# Patient Record
Sex: FEMALE | Race: WHITE | NOT HISPANIC OR LATINO | Employment: UNEMPLOYED | URBAN - METROPOLITAN AREA
[De-identification: names, ages, dates, MRNs, and addresses within clinical notes are randomized per-mention and may not be internally consistent; named-entity substitution may affect disease eponyms.]

---

## 2020-12-18 ENCOUNTER — HOSPITAL ENCOUNTER (INPATIENT)
Facility: HOSPITAL | Age: 65
LOS: 3 days | Discharge: NURSING, REHAB OR LONG-TERM CARE | End: 2020-12-21
Attending: INTERNAL MEDICINE | Admitting: HOSPITALIST

## 2020-12-18 ENCOUNTER — APPOINTMENT (OUTPATIENT)
Dept: GENERAL RADIOLOGY | Facility: HOSPITAL | Age: 65
End: 2020-12-18

## 2020-12-18 PROBLEM — E87.1 HYPONATREMIA: Status: ACTIVE | Noted: 2020-12-18

## 2020-12-18 LAB
ALBUMIN SERPL-MCNC: 3.4 G/DL (ref 3.5–5.2)
ALBUMIN/GLOB SERPL: 1.8 G/DL
ALP SERPL-CCNC: 90 U/L (ref 39–117)
ALT SERPL W P-5'-P-CCNC: 15 U/L (ref 1–33)
ANION GAP SERPL CALCULATED.3IONS-SCNC: 7 MMOL/L (ref 5–15)
AST SERPL-CCNC: 28 U/L (ref 1–32)
BASOPHILS # BLD AUTO: 0 10*3/MM3 (ref 0–0.2)
BASOPHILS NFR BLD AUTO: 0.2 % (ref 0–1.5)
BILIRUB SERPL-MCNC: 0.9 MG/DL (ref 0–1.2)
BUN SERPL-MCNC: 7 MG/DL (ref 8–23)
BUN/CREAT SERPL: 13.2 (ref 7–25)
CALCIUM SPEC-SCNC: 8.4 MG/DL (ref 8.6–10.5)
CHLORIDE SERPL-SCNC: 91 MMOL/L (ref 98–107)
CHOLEST SERPL-MCNC: 125 MG/DL (ref 0–200)
CO2 SERPL-SCNC: 23 MMOL/L (ref 22–29)
CREAT SERPL-MCNC: 0.53 MG/DL (ref 0.57–1)
DEPRECATED RDW RBC AUTO: 44.6 FL (ref 37–54)
EOSINOPHIL # BLD AUTO: 0 10*3/MM3 (ref 0–0.4)
EOSINOPHIL NFR BLD AUTO: 0.3 % (ref 0.3–6.2)
ERYTHROCYTE [DISTWIDTH] IN BLOOD BY AUTOMATED COUNT: 13.9 % (ref 12.3–15.4)
GFR SERPL CREATININE-BSD FRML MDRD: 115 ML/MIN/1.73
GLOBULIN UR ELPH-MCNC: 1.9 GM/DL
GLUCOSE SERPL-MCNC: 96 MG/DL (ref 65–99)
HCT VFR BLD AUTO: 36.9 % (ref 34–46.6)
HDLC SERPL-MCNC: 55 MG/DL (ref 40–60)
HGB BLD-MCNC: 12.7 G/DL (ref 12–15.9)
LDLC SERPL CALC-MCNC: 57 MG/DL (ref 0–100)
LDLC/HDLC SERPL: 1.05 {RATIO}
LYMPHOCYTES # BLD AUTO: 0.9 10*3/MM3 (ref 0.7–3.1)
LYMPHOCYTES NFR BLD AUTO: 10.3 % (ref 19.6–45.3)
MAGNESIUM SERPL-MCNC: 1.5 MG/DL (ref 1.6–2.4)
MCH RBC QN AUTO: 32.4 PG (ref 26.6–33)
MCHC RBC AUTO-ENTMCNC: 34.3 G/DL (ref 31.5–35.7)
MCV RBC AUTO: 94.5 FL (ref 79–97)
MONOCYTES # BLD AUTO: 0.6 10*3/MM3 (ref 0.1–0.9)
MONOCYTES NFR BLD AUTO: 7.3 % (ref 5–12)
NEUTROPHILS NFR BLD AUTO: 7.2 10*3/MM3 (ref 1.7–7)
NEUTROPHILS NFR BLD AUTO: 81.9 % (ref 42.7–76)
NRBC BLD AUTO-RTO: 0.1 /100 WBC (ref 0–0.2)
PLATELET # BLD AUTO: 276 10*3/MM3 (ref 140–450)
PMV BLD AUTO: 6.3 FL (ref 6–12)
POTASSIUM SERPL-SCNC: 3.9 MMOL/L (ref 3.5–5.2)
PROT SERPL-MCNC: 5.3 G/DL (ref 6–8.5)
RBC # BLD AUTO: 3.9 10*6/MM3 (ref 3.77–5.28)
SODIUM SERPL-SCNC: 121 MMOL/L (ref 136–145)
TRIGL SERPL-MCNC: 60 MG/DL (ref 0–150)
TROPONIN T SERPL-MCNC: <0.01 NG/ML (ref 0–0.03)
TSH SERPL DL<=0.05 MIU/L-ACNC: 2.51 UIU/ML (ref 0.27–4.2)
VLDLC SERPL-MCNC: 13 MG/DL (ref 5–40)
WBC # BLD AUTO: 8.7 10*3/MM3 (ref 3.4–10.8)

## 2020-12-18 PROCEDURE — 93010 ELECTROCARDIOGRAM REPORT: CPT | Performed by: INTERNAL MEDICINE

## 2020-12-18 PROCEDURE — 80053 COMPREHEN METABOLIC PANEL: CPT | Performed by: INTERNAL MEDICINE

## 2020-12-18 PROCEDURE — 85025 COMPLETE CBC W/AUTO DIFF WBC: CPT | Performed by: INTERNAL MEDICINE

## 2020-12-18 PROCEDURE — 71045 X-RAY EXAM CHEST 1 VIEW: CPT

## 2020-12-18 PROCEDURE — 25010000002 ENOXAPARIN PER 10 MG: Performed by: INTERNAL MEDICINE

## 2020-12-18 PROCEDURE — 93005 ELECTROCARDIOGRAM TRACING: CPT | Performed by: INTERNAL MEDICINE

## 2020-12-18 PROCEDURE — 84484 ASSAY OF TROPONIN QUANT: CPT | Performed by: INTERNAL MEDICINE

## 2020-12-18 PROCEDURE — 84443 ASSAY THYROID STIM HORMONE: CPT | Performed by: INTERNAL MEDICINE

## 2020-12-18 PROCEDURE — 83735 ASSAY OF MAGNESIUM: CPT | Performed by: INTERNAL MEDICINE

## 2020-12-18 PROCEDURE — 99223 1ST HOSP IP/OBS HIGH 75: CPT | Performed by: INTERNAL MEDICINE

## 2020-12-18 PROCEDURE — 80061 LIPID PANEL: CPT | Performed by: INTERNAL MEDICINE

## 2020-12-18 PROCEDURE — 25010000002 MAGNESIUM SULFATE 2 GM/50ML SOLUTION: Performed by: INTERNAL MEDICINE

## 2020-12-18 RX ORDER — MAGNESIUM SULFATE HEPTAHYDRATE 40 MG/ML
2 INJECTION, SOLUTION INTRAVENOUS ONCE
Status: COMPLETED | OUTPATIENT
Start: 2020-12-18 | End: 2020-12-18

## 2020-12-18 RX ORDER — LISINOPRIL 20 MG/1
20 TABLET ORAL DAILY
Status: ON HOLD | COMMUNITY
End: 2020-12-21 | Stop reason: SDUPTHER

## 2020-12-18 RX ORDER — ACETAMINOPHEN 325 MG/1
650 TABLET ORAL EVERY 4 HOURS PRN
Status: DISCONTINUED | OUTPATIENT
Start: 2020-12-18 | End: 2020-12-22 | Stop reason: HOSPADM

## 2020-12-18 RX ORDER — SODIUM CHLORIDE 0.9 % (FLUSH) 0.9 %
10 SYRINGE (ML) INJECTION EVERY 12 HOURS SCHEDULED
Status: DISCONTINUED | OUTPATIENT
Start: 2020-12-18 | End: 2020-12-22 | Stop reason: HOSPADM

## 2020-12-18 RX ORDER — CITALOPRAM 20 MG/1
20 TABLET ORAL DAILY
COMMUNITY

## 2020-12-18 RX ORDER — SODIUM CHLORIDE 9 MG/ML
50 INJECTION, SOLUTION INTRAVENOUS CONTINUOUS
Status: DISCONTINUED | OUTPATIENT
Start: 2020-12-18 | End: 2020-12-22 | Stop reason: HOSPADM

## 2020-12-18 RX ORDER — CITALOPRAM 20 MG/1
20 TABLET ORAL DAILY
Status: DISCONTINUED | OUTPATIENT
Start: 2020-12-18 | End: 2020-12-22 | Stop reason: HOSPADM

## 2020-12-18 RX ORDER — RISPERIDONE 1 MG/1
2 TABLET ORAL NIGHTLY
Status: DISCONTINUED | OUTPATIENT
Start: 2020-12-18 | End: 2020-12-22 | Stop reason: HOSPADM

## 2020-12-18 RX ORDER — AMLODIPINE BESYLATE 5 MG/1
10 TABLET ORAL
Status: DISCONTINUED | OUTPATIENT
Start: 2020-12-18 | End: 2020-12-22 | Stop reason: HOSPADM

## 2020-12-18 RX ORDER — RISPERIDONE 1 MG/1
1 TABLET ORAL DAILY
COMMUNITY

## 2020-12-18 RX ORDER — LISINOPRIL 20 MG/1
20 TABLET ORAL DAILY
Status: DISCONTINUED | OUTPATIENT
Start: 2020-12-18 | End: 2020-12-18

## 2020-12-18 RX ORDER — ONDANSETRON 2 MG/ML
4 INJECTION INTRAMUSCULAR; INTRAVENOUS EVERY 6 HOURS PRN
Status: DISCONTINUED | OUTPATIENT
Start: 2020-12-18 | End: 2020-12-22 | Stop reason: HOSPADM

## 2020-12-18 RX ORDER — RISPERIDONE 2 MG/1
2 TABLET ORAL NIGHTLY
COMMUNITY

## 2020-12-18 RX ORDER — RISPERIDONE 1 MG/1
1 TABLET ORAL DAILY
Status: DISCONTINUED | OUTPATIENT
Start: 2020-12-19 | End: 2020-12-22 | Stop reason: HOSPADM

## 2020-12-18 RX ORDER — ATORVASTATIN CALCIUM 40 MG/1
40 TABLET, FILM COATED ORAL NIGHTLY
COMMUNITY

## 2020-12-18 RX ORDER — LEVOTHYROXINE SODIUM 0.07 MG/1
75 TABLET ORAL
Status: DISCONTINUED | OUTPATIENT
Start: 2020-12-19 | End: 2020-12-22 | Stop reason: HOSPADM

## 2020-12-18 RX ORDER — SODIUM CHLORIDE 0.9 % (FLUSH) 0.9 %
10 SYRINGE (ML) INJECTION AS NEEDED
Status: DISCONTINUED | OUTPATIENT
Start: 2020-12-18 | End: 2020-12-22 | Stop reason: HOSPADM

## 2020-12-18 RX ORDER — LISINOPRIL 20 MG/1
40 TABLET ORAL DAILY
Status: DISCONTINUED | OUTPATIENT
Start: 2020-12-18 | End: 2020-12-22 | Stop reason: HOSPADM

## 2020-12-18 RX ORDER — ATORVASTATIN CALCIUM 40 MG/1
40 TABLET, FILM COATED ORAL NIGHTLY
Status: DISCONTINUED | OUTPATIENT
Start: 2020-12-18 | End: 2020-12-22 | Stop reason: HOSPADM

## 2020-12-18 RX ORDER — DOCUSATE SODIUM 100 MG/1
100 CAPSULE, LIQUID FILLED ORAL 2 TIMES DAILY
Status: DISCONTINUED | OUTPATIENT
Start: 2020-12-18 | End: 2020-12-22 | Stop reason: HOSPADM

## 2020-12-18 RX ORDER — LEVOTHYROXINE SODIUM 0.07 MG/1
75 TABLET ORAL DAILY
COMMUNITY

## 2020-12-18 RX ADMIN — LISINOPRIL 40 MG: 20 TABLET ORAL at 16:32

## 2020-12-18 RX ADMIN — Medication 10 ML: at 20:13

## 2020-12-18 RX ADMIN — ENOXAPARIN SODIUM 40 MG: 40 INJECTION SUBCUTANEOUS at 20:12

## 2020-12-18 RX ADMIN — AMLODIPINE BESYLATE 10 MG: 5 TABLET ORAL at 16:32

## 2020-12-18 RX ADMIN — CITALOPRAM HYDROBROMIDE 20 MG: 20 TABLET ORAL at 16:32

## 2020-12-18 RX ADMIN — SODIUM CHLORIDE 50 ML/HR: 9 INJECTION, SOLUTION INTRAVENOUS at 16:32

## 2020-12-18 RX ADMIN — ATORVASTATIN CALCIUM 40 MG: 40 TABLET, FILM COATED ORAL at 20:12

## 2020-12-18 RX ADMIN — DOCUSATE SODIUM 100 MG: 100 CAPSULE, LIQUID FILLED ORAL at 20:13

## 2020-12-18 RX ADMIN — MAGNESIUM SULFATE HEPTAHYDRATE 2 G: 40 INJECTION, SOLUTION INTRAVENOUS at 19:07

## 2020-12-18 NOTE — PLAN OF CARE
Goal Outcome Evaluation:  Plan of Care Reviewed With: patient  Progress: no change  Outcome Summary: Pt admitted after having weakness and falling several times at home. Urinalysis @[facility] shows UTI and started on antibiotics there. Is alert/oriented x3 and obeys commands. Able to answer questions and givve history appropriatly, Hypertensive upon admission, home antihypertensive meds ordered. Will monitor vitals and labs for changes.

## 2020-12-18 NOTE — H&P
[facility] Medicine Services      Patient Name: Norma De Los Santos  : 1955  MRN: [id number]  Primary Care Physician: Chavo Shields  Date of admission: 2020    Patient Care Team:  Chavo Shields as PCP - General (Family Medicine)          Subjective   History Present Illness     Chief Complaint: No chief complaint on file.      Need to select a service from the Handoff activity.      History of Present Illness  65-year-old frail-appearing female with history of alcohol abuse (last alcohol use was 9 months ago), bipolar disorder, essential hypertension, hyperlipidemia, multiple falls in the past few months and hypothyroidism.  She was admitted in transfer from [facility] ([place]), where she presented with acute mental status changes/encephalopathy.  Laboratory at [facility] showed hyponatremia, with sodium of 122, toxicology screen unremarkable and alcohol level within normal limits.  She reportedly tested negative for Covid-19.  Chest x-ray showed no acute cardiopulmonary process.  Urine analysis suggestive for urinary tract infection, but denies dysuria or flank pain.     On arrival to [facility], she was hypertensive with blood pressure of 186/172 mmHg.   She denies chest pain, no shortness of breath, fever, chills, no dysuria, abdominal pain, nausea,  vomiting or diarrhea.  She endorses generalized weakness with poor appetite and oral intake.  Laboratory @ [facility]  is remarkable for hyponatremia, with sodium of 121, otherwise CBC is unremarkable.       Review of Systems   Constitution: Positive for decreased appetite.   HENT: Negative.    Eyes: Negative.    Cardiovascular: Negative.    Respiratory: Negative.    Endocrine: Negative.    Skin: Negative.    Musculoskeletal: Positive for falls and muscle weakness.   Gastrointestinal: Negative.    Genitourinary: Negative.    Neurological: Positive for paresthesias and weakness.   Psychiatric/Behavioral: Positive for altered mental status and depression.      Allergic/Immunologic: Negative.    All other systems reviewed and are negative.             Personal History     Past Medical History:   Past Medical History:   Diagnosis Date   • COPD (chronic obstructive pulmonary disease) (CMS/HCC)    • Disease of thyroid gland    • Elevated cholesterol    • GERD (gastroesophageal reflux disease)    • Hypertension        Surgical History:      Past Surgical History:   Procedure Laterality Date   • BREAST LUMPECTOMY     • BREAST SURGERY     • DILATATION AND CURETTAGE     • FRACTURE SURGERY     • ORIF ELBOW FRACTURE     • WISDOM TOOTH EXTRACTION             Family History: family history is not on file. Otherwise pertinent FHx was reviewed and unremarkable.     Social History:  reports that she has been smoking. She has a 22.50 pack-year smoking history. She has never used smokeless tobacco. She reports that she does not use drugs.      Medications:  Prior to Admission medications    Medication Sig Start Date End Date Taking? Authorizing Provider   atorvastatin (LIPITOR) 40 MG tablet Take 40 mg by mouth Every Night.   Yes Tito Miranda MD   citalopram (CeleXA) 20 MG tablet Take 20 mg by mouth Daily.   Yes Tito Miranda MD   levothyroxine (SYNTHROID, LEVOTHROID) 75 MCG tablet Take 75 mcg by mouth Daily.   Yes Tito Miranda MD   lisinopril (PRINIVIL,ZESTRIL) 20 MG tablet Take 20 mg by mouth Daily.   Yes Tito Miranda MD   risperiDONE (risperDAL) 1 MG tablet Take 1 mg by mouth Daily.   Yes Tito Miranda MD   risperiDONE (risperDAL) 2 MG tablet Take 2 mg by mouth Every Night.   Yes Tito Miranda MD       Allergies:    Allergies   Allergen Reactions   • Amoxicillin Hives       Objective   Objective     Vital Signs  Temp:  [98.1 °F (36.7 °C)-98.2 °F (36.8 °C)] 98.2 °F (36.8 °C)  Heart Rate:  [88-89] 88  Resp:  [23-29] 23  BP: (172-186)/() 172/91  SpO2:  [92 %-93 %] 92 %  on   ;   Device (Oxygen Therapy): room air  There is no height  or weight on file to calculate BMI.    Physical Exam  Constitutional:       General: She is not in acute distress.     Appearance: Normal appearance. She is not ill-appearing.   HENT:      Head: Normocephalic and atraumatic.      Mouth/Throat:      Mouth: Mucous membranes are dry.   Eyes:      Extraocular Movements: Extraocular movements intact.      Pupils: Pupils are equal, round, and reactive to light.   Neck:      Musculoskeletal: Normal range of motion and neck supple. No neck rigidity or muscular tenderness.   Cardiovascular:      Rate and Rhythm: Normal rate and regular rhythm.      Pulses: Normal pulses.      Heart sounds: No murmur. No gallop.    Pulmonary:      Effort: Pulmonary effort is normal.      Breath sounds: Normal breath sounds.   Abdominal:      General: Abdomen is flat.   Musculoskeletal: Normal range of motion.   Skin:     General: Skin is warm.   Neurological:      Mental Status: She is alert. Mental status is at baseline.   Psychiatric:         Mood and Affect: Mood normal.         Behavior: Behavior normal.         Results Review:  I have personally reviewed most recent  and agree with findings, most notably:     Results from last 7 days   Lab Units 12/18/20  1524   WBC 10*3/mm3 8.70   HEMOGLOBIN g/dL 12.7   HEMATOCRIT % 36.9   PLATELETS 10*3/mm3 276     Results from last 7 days   Lab Units 12/18/20  1524   SODIUM mmol/L 121*   POTASSIUM mmol/L 3.9   CHLORIDE mmol/L 91*   CO2 mmol/L 23.0   BUN mg/dL 7*   CREATININE mg/dL 0.53*   GLUCOSE mg/dL 96   CALCIUM mg/dL 8.4*   ALT (SGPT) U/L 15   AST (SGOT) U/L 28     CrCl cannot be calculated (Unknown ideal weight.).  Brief Urine Lab Results     None          Microbiology Results (last 10 days)     ** No results found for the last 240 hours. **          ECG/EMG Results (most recent)     None        Xr Chest 1 View    Result Date: 12/18/2020  No acute chest finding.  Electronically Signed By-Libby Mcclure MD On:12/18/2020 3:50 PM This report was  finalized on [id number] by  Libby Mcclure MD.        CrCl cannot be calculated (Unknown ideal weight.).    Assessment/Plan   Assessment/Plan       Active Hospital Problems    Diagnosis  POA   • Hyponatremia [E87.1]  Yes      Resolved Hospital Problems   No resolved problems to display.     AMS/acute toxic metabolic encephalopathy     -multifactorial, 2/2 hyponatremia, volume contraction, suspected UTI    Hyponatremia (maybe chronic )       -serum osmolality a.m., gentle duration with normal saline        -if no improvement, or worsen will consider nephrology consultation and fluid restrict    Longstanding history of alcohol abuse    Accelerated hypertension      -increase lisinopril and added Norvasc    Bipolar disorder      -celexa/risperdal    Hypothyroidism      -Levothyroxine    Mixed hyperlipidemia       -Lipitor    Mechanical fall at home (with multiple bruises present on admission)/weakness       -check B12, folate, and TSH in a.m.        -will consult PT/OT graft                  VTE Prophylaxis -   Mechanical Order History:     None      Pharmalogical Order History:      Ordered     Dose Route Frequency Stop    12/18/20 1514  enoxaparin (LOVENOX) syringe 40 mg      40 mg SC Every 24 Hours --                CODE STATUS:    Code Status and Medical Interventions:   Ordered at: 12/18/20 1514     Level Of Support Discussed With:    Patient     Code Status:    CPR     Medical Interventions (Level of Support Prior to Arrest):    Full             Electronically signed by Lenard Renner MD, 12/18/20, 4:13 PM EST.  [facility] Team

## 2020-12-19 PROBLEM — R53.1 WEAKNESS: Status: ACTIVE | Noted: 2020-12-19

## 2020-12-19 LAB
AMPHET+METHAMPHET UR QL: NEGATIVE
ANION GAP SERPL CALCULATED.3IONS-SCNC: 8 MMOL/L (ref 5–15)
BACTERIA UR QL AUTO: ABNORMAL /HPF
BARBITURATES UR QL SCN: NEGATIVE
BASOPHILS # BLD AUTO: 0 10*3/MM3 (ref 0–0.2)
BASOPHILS NFR BLD AUTO: 0.3 % (ref 0–1.5)
BENZODIAZ UR QL SCN: NEGATIVE
BILIRUB UR QL STRIP: NEGATIVE
BUN SERPL-MCNC: 7 MG/DL (ref 8–23)
BUN/CREAT SERPL: 14.9 (ref 7–25)
CALCIUM SPEC-SCNC: 8 MG/DL (ref 8.6–10.5)
CANNABINOIDS SERPL QL: NEGATIVE
CHLORIDE SERPL-SCNC: 92 MMOL/L (ref 98–107)
CLARITY UR: CLEAR
CO2 SERPL-SCNC: 24 MMOL/L (ref 22–29)
COCAINE UR QL: NEGATIVE
COLOR UR: YELLOW
CREAT SERPL-MCNC: 0.47 MG/DL (ref 0.57–1)
DEPRECATED RDW RBC AUTO: 45.1 FL (ref 37–54)
EOSINOPHIL # BLD AUTO: 0.1 10*3/MM3 (ref 0–0.4)
EOSINOPHIL NFR BLD AUTO: 0.7 % (ref 0.3–6.2)
ERYTHROCYTE [DISTWIDTH] IN BLOOD BY AUTOMATED COUNT: 13.9 % (ref 12.3–15.4)
FOLATE SERPL-MCNC: 7.71 NG/ML (ref 4.78–24.2)
GFR SERPL CREATININE-BSD FRML MDRD: 132 ML/MIN/1.73
GLUCOSE SERPL-MCNC: 101 MG/DL (ref 65–99)
GLUCOSE UR STRIP-MCNC: NEGATIVE MG/DL
HCT VFR BLD AUTO: 34.7 % (ref 34–46.6)
HGB BLD-MCNC: 12.1 G/DL (ref 12–15.9)
HGB UR QL STRIP.AUTO: NEGATIVE
HYALINE CASTS UR QL AUTO: ABNORMAL /LPF
KETONES UR QL STRIP: ABNORMAL
LEUKOCYTE ESTERASE UR QL STRIP.AUTO: ABNORMAL
LYMPHOCYTES # BLD AUTO: 1.1 10*3/MM3 (ref 0.7–3.1)
LYMPHOCYTES NFR BLD AUTO: 13.2 % (ref 19.6–45.3)
MAGNESIUM SERPL-MCNC: 1.9 MG/DL (ref 1.6–2.4)
MCH RBC QN AUTO: 32.4 PG (ref 26.6–33)
MCHC RBC AUTO-ENTMCNC: 34.9 G/DL (ref 31.5–35.7)
MCV RBC AUTO: 93 FL (ref 79–97)
METHADONE UR QL SCN: NEGATIVE
MONOCYTES # BLD AUTO: 0.8 10*3/MM3 (ref 0.1–0.9)
MONOCYTES NFR BLD AUTO: 9.5 % (ref 5–12)
NEUTROPHILS NFR BLD AUTO: 6.1 10*3/MM3 (ref 1.7–7)
NEUTROPHILS NFR BLD AUTO: 76.3 % (ref 42.7–76)
NITRITE UR QL STRIP: POSITIVE
NRBC BLD AUTO-RTO: 0 /100 WBC (ref 0–0.2)
OPIATES UR QL: NEGATIVE
OSMOLALITY SERPL: 259 MOSM/KG (ref 280–300)
OXYCODONE UR QL SCN: NEGATIVE
PH UR STRIP.AUTO: 6.5 [PH] (ref 5–8)
PLATELET # BLD AUTO: 236 10*3/MM3 (ref 140–450)
PMV BLD AUTO: 6 FL (ref 6–12)
POTASSIUM SERPL-SCNC: 3.6 MMOL/L (ref 3.5–5.2)
PROT UR QL STRIP: ABNORMAL
QT INTERVAL: 376 MS
RBC # BLD AUTO: 3.72 10*6/MM3 (ref 3.77–5.28)
RBC # UR: ABNORMAL /HPF
REF LAB TEST METHOD: ABNORMAL
SODIUM SERPL-SCNC: 124 MMOL/L (ref 136–145)
SP GR UR STRIP: 1.02 (ref 1–1.03)
SQUAMOUS #/AREA URNS HPF: ABNORMAL /HPF
UROBILINOGEN UR QL STRIP: ABNORMAL
VIT B12 BLD-MCNC: 476 PG/ML (ref 211–946)
WBC # BLD AUTO: 8 10*3/MM3 (ref 3.4–10.8)
WBC UR QL AUTO: ABNORMAL /HPF

## 2020-12-19 PROCEDURE — 80307 DRUG TEST PRSMV CHEM ANLYZR: CPT | Performed by: INTERNAL MEDICINE

## 2020-12-19 PROCEDURE — 81001 URINALYSIS AUTO W/SCOPE: CPT | Performed by: INTERNAL MEDICINE

## 2020-12-19 PROCEDURE — 99232 SBSQ HOSP IP/OBS MODERATE 35: CPT | Performed by: INTERNAL MEDICINE

## 2020-12-19 PROCEDURE — 97166 OT EVAL MOD COMPLEX 45 MIN: CPT

## 2020-12-19 PROCEDURE — 82746 ASSAY OF FOLIC ACID SERUM: CPT | Performed by: INTERNAL MEDICINE

## 2020-12-19 PROCEDURE — 85025 COMPLETE CBC W/AUTO DIFF WBC: CPT | Performed by: INTERNAL MEDICINE

## 2020-12-19 PROCEDURE — 83735 ASSAY OF MAGNESIUM: CPT | Performed by: INTERNAL MEDICINE

## 2020-12-19 PROCEDURE — 83930 ASSAY OF BLOOD OSMOLALITY: CPT | Performed by: INTERNAL MEDICINE

## 2020-12-19 PROCEDURE — 87086 URINE CULTURE/COLONY COUNT: CPT | Performed by: INTERNAL MEDICINE

## 2020-12-19 PROCEDURE — 82607 VITAMIN B-12: CPT | Performed by: INTERNAL MEDICINE

## 2020-12-19 PROCEDURE — 25010000002 ENOXAPARIN PER 10 MG: Performed by: INTERNAL MEDICINE

## 2020-12-19 PROCEDURE — 80048 BASIC METABOLIC PNL TOTAL CA: CPT | Performed by: INTERNAL MEDICINE

## 2020-12-19 PROCEDURE — 97162 PT EVAL MOD COMPLEX 30 MIN: CPT

## 2020-12-19 RX ADMIN — Medication 10 ML: at 08:53

## 2020-12-19 RX ADMIN — DOCUSATE SODIUM 100 MG: 100 CAPSULE, LIQUID FILLED ORAL at 08:53

## 2020-12-19 RX ADMIN — MAGNESIUM GLUCONATE 500 MG ORAL TABLET 400 MG: 500 TABLET ORAL at 08:53

## 2020-12-19 RX ADMIN — DOCUSATE SODIUM 100 MG: 100 CAPSULE, LIQUID FILLED ORAL at 21:20

## 2020-12-19 RX ADMIN — CITALOPRAM HYDROBROMIDE 20 MG: 20 TABLET ORAL at 08:53

## 2020-12-19 RX ADMIN — RISPERIDONE 1 MG: 1 TABLET ORAL at 08:53

## 2020-12-19 RX ADMIN — SODIUM CHLORIDE 50 ML/HR: 9 INJECTION, SOLUTION INTRAVENOUS at 10:50

## 2020-12-19 RX ADMIN — RISPERIDONE 2 MG: 1 TABLET ORAL at 21:20

## 2020-12-19 RX ADMIN — ATORVASTATIN CALCIUM 40 MG: 40 TABLET, FILM COATED ORAL at 21:20

## 2020-12-19 RX ADMIN — AMLODIPINE BESYLATE 10 MG: 5 TABLET ORAL at 08:53

## 2020-12-19 RX ADMIN — LEVOTHYROXINE SODIUM 75 MCG: 0.07 TABLET ORAL at 05:28

## 2020-12-19 RX ADMIN — LISINOPRIL 40 MG: 20 TABLET ORAL at 08:53

## 2020-12-19 RX ADMIN — ENOXAPARIN SODIUM 40 MG: 40 INJECTION SUBCUTANEOUS at 16:04

## 2020-12-19 RX ADMIN — Medication 10 ML: at 21:20

## 2020-12-19 NOTE — PLAN OF CARE
Goal Outcome Evaluation:  Plan of Care Reviewed With: patient  Progress: no change  Outcome Summary: 66 yo female admitted with weakness and falls.  d/o UTI.  Pt reports being ind at home, no assistive device use, lives with her son.  Pt has multiple skin tears on UEs from falls, decreased functional ROM LUE from prior accident.  Pt with gait and balance deficits putting her at risk for falls.  Transfers OOB with min A x2.  Left RW in room for furture use.  Concern for pt at home in current condition, recommend IP rehab at d/c.  Discussed with pt and she is agreeable.  Will follow 5x/week.  PPE worn:  gloves, mask, goggles.

## 2020-12-19 NOTE — PLAN OF CARE
Pt A&Ox3, answers ques appropriately, up in chair, from home w/ son, call light in reach, v/s stable, RN will cont to monitor.

## 2020-12-19 NOTE — THERAPY EVALUATION
Patient Name: Norma De Los Santos  : 1955    MRN: [id number]                              Today's Date: 2020       Admit Date: 2020    Visit Dx: No diagnosis found.  Patient Active Problem List   Diagnosis   • Hyponatremia     Past Medical History:   Diagnosis Date   • COPD (chronic obstructive pulmonary disease) (CMS/HCC)    • Disease of thyroid gland    • Elevated cholesterol    • GERD (gastroesophageal reflux disease)    • Hypertension      Past Surgical History:   Procedure Laterality Date   • BREAST LUMPECTOMY     • BREAST SURGERY     • DILATATION AND CURETTAGE     • FRACTURE SURGERY     • ORIF ELBOW FRACTURE     • WISDOM TOOTH EXTRACTION       General Information     Row Name 20 1115          OT Time and Intention    Document Type  evaluation  -NANCY     Mode of Treatment  occupational therapy  -NANCY     Row Name 20 1115          General Information    Patient Profile Reviewed  yes  -NANCY     Prior Level of Function  independent:;ADL's;all household mobility  -NANCY     Existing Precautions/Restrictions  fall  -NANCY     Barriers to Rehab  none identified  -NNACY     Row Name 20 1115          Occupational Profile    Reason for Services/Referral (Occupational Profile)  Pt is a 64 y/o F presenting to ER with weakness and multiple falls. UA noted at OSH + for UTI. Pending additional workup. Pt reports living with her son. Pt reports indep with ADLs and mobility until recently 2/2 weakness. Pt noted to have scattered skin abraisions throughout her UEs with b/l hands wrapped in kerlex.  -NANCY     Row Name 20 1115          Living Environment    Lives With  child(kiah), adult  -NANCY     Row Name 20 1115          Home Main Entrance    Number of Stairs, Main Entrance  none  -NANCY     Row Name 20 1115          Cognition    Orientation Status (Cognition)  oriented x 3  -NANCY     Row Name 20 1115          Safety Issues, Functional Mobility    Safety Issues Affecting Function (Mobility)   insight into deficits/self-awareness;problem-solving  -     Impairments Affecting Function (Mobility)  endurance/activity tolerance;range of motion (ROM);shortness of breath;pain;balance;postural/trunk control  -     Comment, Safety Issues/Impairments (Mobility)  gait belt and non skid socks used  -       User Key  (r) = Recorded By, (t) = Taken By, (c) = Cosigned By    Initials Name Provider Type     Flora Perkins OT Occupational Therapist          Mobility/ADL's     Row Name 12/19/20 1120          Bed Mobility    Bed Mobility  supine-sit  -     Supine-Sit [place] (Bed Mobility)  minimum assist (75% patient effort);1 person assist  -     Bed Mobility, Safety Issues  decreased use of arms for pushing/pulling  -     Assistive Device (Bed Mobility)  bed rails;head of bed elevated  -     Row Name 12/19/20 1120          Transfers    Transfers  bed-chair transfer  -     Comment (Transfers)  Min A x 2 due to posterior lean noted  -     Bed-Chair [place] (Transfers)  2 person assist;minimum assist (75% patient effort);set up  -     Row Name 12/19/20 [facility]          Functional Mobility    Functional Mobility- Ind. Level  2 person assist required;minimum assist (75% patient effort)  -     Functional Mobility-Distance (Feet)  3  -     Functional Mobility- Safety Issues  supplemental O2;step length decreased;loses balance backward  -     Row Name 12/19/20 1120          Activities of Daily Living    BADL Assessment/Intervention  lower body dressing;feeding  -     Row Name 12/19/20 [facility]          Lower Body Dressing Assessment/Training    [place] Level (Lower Body Dressing)  don;doff;socks;moderate assist (50% patient effort);maximum assist (25% patient effort)  -     Position (Lower Body Dressing)  edge of bed sitting  -     Row Name 12/19/20 1120          Self-Feeding Assessment/Training    [place] Level (Feeding)  liquids to mouth;set up;standby assist  -      Position (Self-Feeding)  supported sitting  -       User Key  (r) = Recorded By, (t) = Taken By, (c) = Cosigned By    Initials Name Provider Type    Flora Adkins OT Occupational Therapist        Obj/Interventions     Row Name 12/19/20 1123          Sensory Assessment (Somatosensory)    Sensory Assessment (Somatosensory)  sensation intact  -     Row Name 12/19/20 1123          Vision Assessment/Intervention    Visual Impairment/Limitations  WFL  -     Row Name 12/19/20 1123          Range of Motion Comprehensive    General Range of Motion  upper extremity range of motion deficits identified  -     Comment, General Range of Motion  LUE: Shoulder AROM 0-35 deg. PROM 0-85 deg. Elbow AROM 90-20 deg (lacking full ext from accident years ago). Wrist/Digit AROM WFL despite L RF PIP in hyperextension. RUE: AROM WFL.  -     Row Name 12/19/20 1123          Strength Comprehensive (MMT)    General Manual Muscle Testing (MMT) Assessment  upper extremity strength deficits identified  -     Comment, General Manual Muscle Testing (MMT) Assessment  LUE: shoulder 2-/5, elbow 3/5,  3/5. RUE shoulder 3-/5, elbow 3/5,  3/5  -     Row Name 12/19/20 1123          Balance    Balance Assessment  sitting static balance;standing static balance  -     Static Sitting Balance  WFL;sitting, edge of bed;unsupported  -NANCY     Static Standing Balance  mild impairment;supported;standing  -     Comment, Balance  Posterior lean noted with ambulation and sit to stand from EOB level. Mild LOB posteriorly while at EOB but able to self correct with SBA.  -       User Key  (r) = Recorded By, (t) = Taken By, (c) = Cosigned By    Initials Name Provider Type    Flora Adkins OT Occupational Therapist        Goals/Plan     Row Name 12/19/20 1130          Transfer Goal 1 (OT)    Activity/Assistive Device (Transfer Goal 1, OT)  sit-to-stand/stand-to-sit;bed-to-chair/chair-to-bed;commode, bedside without drop  arms;walker, rolling  -NANCY     [place] Level/Cues Needed (Transfer Goal 1, OT)  set-up required;standby assist  -NANCY     Time Frame (Transfer Goal 1, OT)  long term goal (LTG);2 weeks  -     Row Name 12/19/20 1130          Bathing Goal 1 (OT)    Activity/Device (Bathing Goal 1, OT)  upper body bathing;lower body bathing  -NANCY     [place] Level/Cues Needed (Bathing Goal 1, OT)  set-up required;minimum assist (75% or more patient effort)  -NANCY     Time Frame (Bathing Goal 1, OT)  long term goal (LTG);2 weeks  -[facility] Name 12/19/20 1130          Dressing Goal 1 (OT)    Activity/Device (Dressing Goal 1, OT)  upper body dressing;lower body dressing  -NANCY     [place]/Cues Needed (Dressing Goal 1, OT)  set-up required;minimum assist (75% or more patient effort)  -NANCY     Time Frame (Dressing Goal 1, OT)  long term goal (LTG);2 weeks  -[facility] Name 12/19/20 1130          Therapy Assessment/Plan (OT)    Planned Therapy Interventions (OT)  activity tolerance training;functional balance retraining;occupation/activity based interventions;ROM/therapeutic exercise;strengthening exercise;transfer/mobility retraining;patient/caregiver education/training;neuromuscular control/coordination retraining;cognitive/visual perception retraining;edema control/reduction;BADL retraining;adaptive equipment training  -       User Key  (r) = Recorded By, (t) = Taken By, (c) = Cosigned By    Initials Name Provider Type    NANCY Flora Perkins OT Occupational Therapist        Clinical Impression     [facility] Name 12/19/20 1124          Pain Assessment    Additional Documentation  Pain Scale: FACES Pre/Post-Treatment (Group)  -[facility] Name 12/19/20 1121          Pain Scale: FACES Pre/Post-Treatment    Pain: FACES Scale, Pretreatment  2-->hurts little bit  -NANCY     Posttreatment Pain Rating  4-->hurts little more  -     Pain Location - Side  Left  -     Pain Location  hip  -NANCY     Pre/Posttreatment Pain Comment  RN notified.  No obvious eccyhmosis around area of reported discomfort.  -     Row Name 12/19/20 1127          Plan of Care Review    Plan of Care Reviewed With  patient  -NANCY     Outcome Summary  Pt is a 66 y/o F presenting to ER with weakness and multiple falls. UA noted at OSH + for UTI. Pending additional workup. Pt reports living with her son. Pt reports indep with ADLs and mobility until recently 2/2 weakness. Pt noted to have scattered skin abrasions throughout her UEs with b/l hands wrapped in kerlex. Pt noted to have LUE ROM deficits at baseline from accident years ago per her report. Pt tolerated getting to EOB and transferring to chair with min A x 2 due to posterior lean and short step pattern. Pt appears significantly below her ADL baseline, limited by impaired activity tolerance, impaired ROM/strength, impaired balance, impaired insight and acuity of illness. OT recommends continued treatment at 5x/week and d/c to IP rehab pending progress and medical stability. PPE worn: mask, gloves and goggles.  -     Row Name 12/19/20 1127          Therapy Assessment/Plan (OT)    Rehab Potential (OT)  fair, will monitor progress closely  -     Criteria for Skilled Therapeutic Interventions Met (OT)  skilled treatment is necessary  -NANCY     Therapy Frequency (OT)  5 times/wk  -     Row Name 12/19/20 1127          Therapy Plan Review/Discharge Plan (OT)    Anticipated Discharge Disposition (OT)  inpatient rehabilitation facility  -     Row Name 12/19/20 1127          Vital Signs    Pretreatment Heart Rate (beats/min)  85  -NANCY     Posttreatment Heart Rate (beats/min)  56  -NANCY     Pre SpO2 (%)  97  -NANCY     O2 Delivery Pre Treatment  nasal cannula  -NANCY     Post SpO2 (%)  94  -NANCY     O2 Delivery Post Treatment  room air  -     Row Name 12/19/20 1127          Positioning and Restraints    Pre-Treatment Position  in bed  -NANCY     Post Treatment Position  chair  -NANCY     In Chair  notified nsg;sitting;call light within  reach;encouraged to call for assist;exit alarm on  -       User Key  (r) = Recorded By, (t) = Taken By, (c) = Cosigned By    Initials Name Provider Type    Flora Adkins OT Occupational Therapist        Outcome Measures     Row Name 12/19/20 1131          Modified [place] Scale    Modified [place] Scale  4 - Moderately severe disability.  Unable to walk without assistance, and unable to attend to own bodily needs without assistance.  -     Row Name 12/19/20 1131          Functional Assessment    Outcome Measure Options  Modified [place]  -       User Key  (r) = Recorded By, (t) = Taken By, (c) = Cosigned By    Initials Name Provider Type    Flora Adkins OT Occupational Therapist        Occupational Therapy Education                 Title: PT OT SLP Therapies (In Progress)     Topic: Occupational Therapy (Done)     Point: ADL training (Done)     Description:   Instruct learner(s) on proper safety adaptation and remediation techniques during self care or transfers.   Instruct in proper use of assistive devices.              Learning Progress Summary           Patient Acceptance, E, VU,DU,NR by  at 12/19/2020 1131                               User Key     Initials Effective Dates Name Provider Type Discipline     07/15/20 -  Flora Perkins OT Occupational Therapist OT              OT Recommendation and Plan  Planned Therapy Interventions (OT): activity tolerance training, functional balance retraining, occupation/activity based interventions, ROM/therapeutic exercise, strengthening exercise, transfer/mobility retraining, patient/caregiver education/training, neuromuscular control/coordination retraining, cognitive/visual perception retraining, edema control/reduction, BADL retraining, adaptive equipment training  Therapy Frequency (OT): 5 times/wk  Plan of Care Review  Plan of Care Reviewed With: patient  Outcome Summary: Pt is a 66 y/o F presenting to ER with weakness and multiple  falls. UA noted at OSH + for UTI. Pending additional workup. Pt reports living with her son. Pt reports indep with ADLs and mobility until recently 2/2 weakness. Pt noted to have scattered skin abrasions throughout her UEs with b/l hands wrapped in kerlex. Pt noted to have LUE ROM deficits at baseline from accident years ago per her report. Pt tolerated getting to EOB and transferring to chair with min A x 2 due to posterior lean and short step pattern. Pt appears significantly below her ADL baseline, limited by impaired activity tolerance, impaired ROM/strength, impaired balance, impaired insight and acuity of illness. OT recommends continued treatment at 5x/week and d/c to IP rehab pending progress and medical stability. PPE worn: mask, gloves and goggles.     Time Calculation:   Time Calculation- OT     Row Name 12/19/20 1132             Time Calculation- OT    OT Start Time  0850  -NANCY      OT Stop Time  0908  -      OT Time Calculation (min)  18 min  -      Total Timed Code Minutes- OT  18 minute(s)  -NANCY      OT Received On  12/19/20  -NANCY      OT - Next Appointment  12/21/20  -      OT Goal Re-Cert Due Date  01/02/21  -        User Key  (r) = Recorded By, (t) = Taken By, (c) = Cosigned By    Initials Name Provider Type    Flora Adkins OT Occupational Therapist        Therapy Charges for Today     Code Description Service Date Service Provider Modifiers Qty    [id number]  OT EVAL MOD COMPLEXITY 3 12/19/2020 Flora Perkins OT GO 1               Flora Perkins OT  12/19/2020

## 2020-12-19 NOTE — DISCHARGE PLACEMENT REQUEST
"Norma Schneider (65 y.o. Female)     Date of Birth Social Security Number Address Home Phone MRN    1955  [address] [place] [address] [phone] [id number]    B[employer]ism Marital Status          Non-Yazidism        Admission Date Admission Type Admitting Provider Attending Provider Department, Room/Bed    12/18/20 Urgent Lenard Renner MD Ojo-Oniyun, Saheed G, MD [facility] NEURO HEART, 267/1    Discharge Date Discharge Disposition Discharge Destination                       Attending Provider: Lenard Renner MD    Allergies: Amoxicillin    Isolation: None   Infection: None   Code Status: CPR    Ht: 180.3 cm (71\")   Wt: 67.3 kg (148 lb 5.9 oz)    Admission Cmt: None   Principal Problem: None                Active Insurance as of 12/18/2020     Primary Coverage     Payor Plan Insurance Group Employer/Plan Group    [employer]UE [employer]OSS [employer]UE [employer]OSS BLUE [employer] PPO [id number]     Payor Plan Address Payor Plan Phone Number Payor Plan Fax Number Effective Dates    [address] [phone]  12/18/2020 - None Entered    [place] [address]       Subscriber Name Subscriber Birth Date Member ID       KEVIN SCHNEIDER  [id number]                 Emergency Contacts      (Rel.) Home Phone Work Phone Mobile Phone    Kevin Schneider (Spouse) [phone] -- [phone]    Wally Schneider (Son) -- -- [phone]            History & Physical    No notes of this type exist for this encounter.         {Outbreak/Travel/Exposure Documentation......;  Question Available Choices Patient Response   Outbreak Screen: Do you currently have a new onset of the following symptoms?        Fever/Chils, Cough, Shortness of air, Loss of taste or smell, No, Unknown  (!) Shortness of Air (12/18/20 1230)   Outbreak Screen: In the last 14 days, have you had contact with anyone who is ill, has show any of the symptoms listed above and/or has been diagnosis with the 2019 Novel Coronavirus? " This includes any immediate household members but excludes any patients with whom you have been in contact within your normal work duties wearing proper PPE, if you are a healthcare worker.  Yes, No, Unknown              Unknown (12/18/20 1230)   Outbreak Screen: Who was notified?    Free text  (not recorded)   Travel Screen: Have you traveled in the last month? If so, to what country have you traveled? If [place] what state? Yes, No, Unknown  List of all countries  List of all States No (12/18/20 1400)  (not recorded)  (not recorded)   Infection Risk: Do you currently have the following symptoms?  (If cough is selected, the Tuberculosis Screen is performed.) Cough, Fever, Rash, No (!) Cough (12/18/20 1400)   Tuberculosis Screen: Do you have any of the following Tuberculosis Risks?  · Have you lived or spent time with anyone who had or may have TB?  · Have you lived in or visited any of the following areas for more than one month: [place], [place], [place], [place] or [place], the [place] or [place]?  · Do you have HIV/AIDS?  · Have you lived in or worked in a nursing home, homeless shelter, correctional facility, or substance abuse treatment facility?   · No    If Yes do you have any of the following symptoms? Yes responses display to the right    If Yes, symptoms listed are:  Cough greater than or equal to 3 weeks, Loss of appetite, Unexplained weight loss, Night sweats, Bloody sputum or hemoptysis, Hoarseness, Fever, Fatigue, Chest pain, No No (12/18/20 1400)  (not recorded)   Exposure Screen: Have you been exposed to any of these contagious diseases in the last month? Measles, Chickenpox, Meningitis, Pertussis, Whooping Cough, No No (12/18/20 1400)       Current Facility-Administered Medications   Medication Dose Route Frequency Provider Last Rate Last Admin   • acetaminophen (TYLENOL) tablet 650 mg  650 mg Oral Q4H PRN Lenard Renner MD       • amLODIPine (NORVASC) tablet 10 mg  10 mg Oral Q24H  Lenard Renner MD   10 mg at 12/19/20 0853   • atorvastatin (LIPITOR) tablet 40 mg  40 mg Oral Nightly Lenard Renner MD   40 mg at 12/18/20 2012   • citalopram (CeleXA) tablet 20 mg  20 mg Oral Daily Lenard Renner MD   20 mg at 12/19/20 0853   • docusate sodium (COLACE) capsule 100 mg  100 mg Oral BID Lenard Renner MD   100 mg at 12/19/20 0853   • enoxaparin (LOVENOX) syringe 40 mg  40 mg Subcutaneous Q24H Lenard Renner MD   40 mg at 12/18/20 2012   • levothyroxine (SYNTHROID, LEVOTHROID) tablet 75 mcg  75 mcg Oral Q AM Lenard Renner MD   75 mcg at 12/19/20 0528   • lisinopril (PRINIVIL,ZESTRIL) tablet 40 mg  40 mg Oral Daily Lenard Renner MD   40 mg at 12/19/20 0853   • magnesium oxide (MAGOX) tablet 400 mg  400 mg Oral Daily Lenard Renner MD   400 mg at 12/19/20 0853   • ondansetron (ZOFRAN) injection 4 mg  4 mg Intravenous Q6H PRN Lenard Renner MD       • risperiDONE (risperDAL) tablet 1 mg  1 mg Oral Daily Lenard Renner MD   1 mg at 12/19/20 0853   • risperiDONE (risperDAL) tablet 2 mg  2 mg Oral Nightly Lenard Renner MD       • sodium chloride 0.9 % flush 10 mL  10 mL Intravenous Q12H Lenard Renner MD   10 mL at 12/19/20 0853   • sodium chloride 0.9 % flush 10 mL  10 mL Intravenous PRN Lenard Renner MD       • sodium chloride 0.9 % infusion  50 mL/hr Intravenous Continuous Lenard Renner MD 50 mL/hr at 12/18/20 1632 50 mL/hr at 12/18/20 1632     Physician Progress Notes (most recent note)    No notes of this type exist for this encounter.         Consult Notes (most recent note)    No notes of this type exist for this encounter.         Physical Therapy Notes (most recent note)    No notes exist for this encounter.         Occupational Therapy Notes (most recent note)    No notes exist for this encounter.
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
## 2020-12-19 NOTE — PROGRESS NOTES
Continued Stay Note   Sha     Patient Name: Norma De Los Santos  MRN: [id number]  Today's Date: 12/19/2020    Admit Date: 12/18/2020    Discharge Plan     Row Name 12/19/20 1416       Plan    Plan  [facility] at d/c. Pre-cert waived per COVID-19 emergency preparedness waiver. PASRR approved per CMS 1135 waiver.    Plan Comments  Pt has been accepted at [facility] per liaison (Ne). Pre-cert waived due to COVID-19 preparedness waiver. SW notified RN & MD via secure chat.        Vilma Acosta, JOESPHW, LSW  PRN   Phone: [phone]

## 2020-12-19 NOTE — PLAN OF CARE
Problem: Adult Inpatient Plan of Care  Goal: Plan of Care Review  Outcome: Ongoing, Progressing  Flowsheets (Taken 12/19/2020 0419)  Progress: no change  Plan of Care Reviewed With: patient  Goal: Patient-Specific Goal (Individualized)  Outcome: Ongoing, Progressing  Goal: Absence of Hospital-Acquired Illness or Injury  Outcome: Ongoing, Progressing  Intervention: Identify and Manage Fall Risk  Recent Flowsheet Documentation  Taken 12/19/2020 0318 by Ortiz Sandoval RN  Safety Promotion/Fall Prevention:   safety round/check completed   room organization consistent   nonskid shoes/slippers when out of bed   lighting adjusted   fall prevention program maintained   clutter free environment maintained   assistive device/personal items within reach   activity supervised  Taken 12/19/2020 0000 by Ortiz Sandoval RN  Safety Promotion/Fall Prevention:   safety round/check completed   room organization consistent   nonskid shoes/slippers when out of bed   lighting adjusted   fall prevention program maintained   clutter free environment maintained   assistive device/personal items within reach   activity supervised  Taken 12/18/2020 2000 by Ortiz Sandoval RN  Safety Promotion/Fall Prevention:   safety round/check completed   room organization consistent   nonskid shoes/slippers when out of bed   lighting adjusted   fall prevention program maintained   clutter free environment maintained   assistive device/personal items within reach   activity supervised  Intervention: Prevent Infection  Recent Flowsheet Documentation  Taken 12/19/2020 0318 by Ortiz Sandoval RN  Infection Prevention:   personal protective equipment utilized   visitors restricted/screened  Taken 12/19/2020 0000 by Ortiz Sandoval RN  Infection Prevention:   personal protective equipment utilized   visitors restricted/screened  Taken 12/18/2020 2000 by Ortiz Sandoval RN  Infection Prevention:   personal protective equipment utilized    visitors restricted/screened  Goal: Optimal Comfort and Wellbeing  Outcome: Ongoing, Progressing  Intervention: Provide Person-Centered Care  Recent Flowsheet Documentation  Taken 12/19/2020 0318 by Ortiz Sandoval RN  Trust Relationship/Rapport: care explained  Taken 12/19/2020 0000 by Ortiz Sandoval RN  Trust Relationship/Rapport:   care explained   choices provided   thoughts/feelings acknowledged  Taken 12/18/2020 2000 by Ortiz Sandoval RN  Trust Relationship/Rapport:   care explained   choices provided   thoughts/feelings acknowledged  Goal: Readiness for Transition of Care  Outcome: Ongoing, Progressing     Problem: Fall Injury Risk  Goal: Absence of Fall and Fall-Related Injury  Outcome: Ongoing, Progressing  Intervention: Identify and Manage Contributors to Fall Injury Risk  Recent Flowsheet Documentation  Taken 12/19/2020 0318 by Ortiz Sandoval RN  Medication Review/Management: medications reviewed  Taken 12/19/2020 0000 by Ortiz Sandoval RN  Medication Review/Management: medications reviewed  Taken 12/18/2020 2000 by Ortiz Sandoval RN  Medication Review/Management: medications reviewed  Intervention: Promote Injury-Free Environment  Recent Flowsheet Documentation  Taken 12/19/2020 0318 by Ortiz Sandoval RN  Safety Promotion/Fall Prevention:   safety round/check completed   room organization consistent   nonskid shoes/slippers when out of bed   lighting adjusted   fall prevention program maintained   clutter free environment maintained   assistive device/personal items within reach   activity supervised  Taken 12/19/2020 0000 by Ortiz Sandoval RN  Safety Promotion/Fall Prevention:   safety round/check completed   room organization consistent   nonskid shoes/slippers when out of bed   lighting adjusted   fall prevention program maintained   clutter free environment maintained   assistive device/personal items within reach   activity supervised  Taken 12/18/2020 2000 by  Ortiz Sandoval, RN  Safety Promotion/Fall Prevention:   safety round/check completed   room organization consistent   nonskid shoes/slippers when out of bed   lighting adjusted   fall prevention program maintained   clutter free environment maintained   assistive device/personal items within reach   activity supervised     Problem: Skin Injury Risk Increased  Goal: Skin Health and Integrity  Outcome: Ongoing, Progressing  Intervention: Optimize Skin Protection  Recent Flowsheet Documentation  Taken 12/19/2020 0000 by Ortiz Sandoval RN  Pressure Reduction Techniques:   frequent weight shift encouraged   weight shift assistance provided  Pressure Reduction Devices:   pressure-redistributing mattress utilized   positioning supports utilized  Skin Protection:   adhesive use limited   incontinence pads utilized  Taken 12/18/2020 2000 by Ortiz Sandoval RN  Pressure Reduction Techniques:   frequent weight shift encouraged   weight shift assistance provided  Pressure Reduction Devices:   pressure-redistributing mattress utilized   positioning supports utilized  Skin Protection:   adhesive use limited   incontinence pads utilized   tubing/devices free from skin contact     Problem: Electrolyte Imbalance  Goal: Electrolyte Balance  Outcome: Ongoing, Progressing  Intervention: Monitor and Manage Electrolyte Imbalance  Recent Flowsheet Documentation  Taken 12/19/2020 0318 by Ortiz Sandoval RN  Fluid/Electrolyte Management: fluids provided  Taken 12/19/2020 0000 by Ortiz Sandoval RN  Fluid/Electrolyte Management: fluids provided  Taken 12/18/2020 2000 by Ortiz Sandoval RN  Fluid/Electrolyte Management: fluids provided   Goal Outcome Evaluation:  Plan of Care Reviewed With: patient  Progress: no change

## 2020-12-19 NOTE — PROGRESS NOTES
"      [facility] Medicine Services Daily Progress Note      Hospitalist Team  LOS 1 days      Patient Care Team:  Chavo Shields as PCP - General (Family Medicine)    Patient Location: 267/1      Subjective   Subjective   I feel a little bit better this morning.  Chief Complaint / Subjective  No chief complaint on file.        Brief Synopsis of Hospital Course/HPI  65-year-old frail-appearing female with history of alcohol abuse (last alcohol use was 9 months ago), bipolar disorder, essential hypertension, hyperlipidemia, multiple falls in the past few months and hypothyroidism.  She was admitted in transfer from [facility] ([facility], where she presented with acute mental status changes/encephalopathy.  Laboratory at [facility] showed hyponatremia, with sodium of 122, toxicology screen unremarkable and alcohol level within normal limits.  She reportedly tested negative for Covid-19.  Chest x-ray showed no acute cardiopulmonary process.  Urine analysis suggestive for urinary tract infection, but denies dysuria or flank pain.    On arrival to [facility], she was hypertensive with blood pressure of 186/172 mmHg.   She denies chest pain, no shortness of breath, fever, chills, no dysuria, abdominal pain, nausea,  vomiting or diarrhea.  She endorses generalized weakness with poor appetite and oral intake.  Laboratory @ [facility]  is remarkable for hyponatremia, with sodium of 121, otherwise CBC is unremarkable.       Objective   Objective    Seen and examined in follow-up.  Sitting comfortably in chair in no distress or discomfort.  No events reported overnight.  Vital Signs  Temp:  [97.4 °F (36.3 °C)-99 °F (37.2 °C)] 97.4 °F (36.3 °C)  Heart Rate:  [] 83  Resp:  [22-28] 28  BP: (137-172)/(73-91) 137/73  Oxygen Therapy  SpO2: 95 %  Pulse Oximetry Type: Continuous  Device (Oxygen Therapy): room air  Flow (L/min): 2  Flowsheet Rows      First Filed Value   Admission Height  180.3 cm (71\") Documented at 12/18/2020 1900   "   Admission Weight  67.1 kg (147 lb 14.9 oz) Documented at 12/18/2020 1900        Intake & Output (last 3 days)       12/16 0701 - 12/17 0700 12/17 0701 - 12/18 0700 12/18 0701 - 12/19 0700 12/19 0701 - 12/20 0700    P.O.   444 480    I.V. (mL/kg)   1000 (14.9)     Total Intake(mL/kg)   1444 (21.5) 480 (7.1)    Net   +1444 +480            Urine Unmeasured Occurrence   4 x 1 x    Stool Unmeasured Occurrence    1 x        Lines, Drains & Airways    Active LDAs     Name:   Placement date:   Placement time:   Site:   Days:    Peripheral IV (Ped/Tyler) 12/18/20 Right;Anterior Forearm   12/18/20    0330     1                Physical Exam  Constitutional:       General: She is not in acute distress.     Appearance: Normal appearance. She is not ill-appearing.   HENT:      Head: Normocephalic and atraumatic.      Mouth/Throat:      Mouth: Mucous membranes are dry.   Eyes:      Extraocular Movements: Extraocular movements intact.      Pupils: Pupils are equal, round, and reactive to light.   Neck:      Musculoskeletal: Normal range of motion and neck supple. No neck rigidity or muscular tenderness.   Cardiovascular:      Rate and Rhythm: Normal rate and regular rhythm.      Pulses: Normal pulses.      Heart sounds: No murmur. No gallop.    Pulmonary:      Effort: Pulmonary effort is normal.      Breath sounds: Normal breath sounds.   Abdominal:      General: Abdomen is flat.   Musculoskeletal: Normal range of motion.   Skin:     General: Skin is warm.   Neurological:      Mental Status: She is alert. Mental status is at baseline.   Psychiatric:         Mood and Affect: Mood normal.         Behavior: Behavior normal.       Procedures:    Results Review:     I reviewed the patient's new clinical results.      Lab Results (last 24 hours)     Procedure Component Value Units Date/Time    Folate [[id number]]  (Normal) Collected: 12/19/20 0421    Specimen: Blood Updated: 12/19/20 1156     Folate 7.71 ng/mL     Narrative:      Results  may be falsely increased if patient taking Biotin.      Vitamin B12 [[id number]]  (Normal) Collected: 12/19/20 0421    Specimen: Blood Updated: 12/19/20 1156     Vitamin B-12 476 pg/mL     Narrative:      Results may be falsely increased if patient taking Biotin.      Osmolality, Serum [[id number]]  (Abnormal) Collected: 12/19/20 0852    Specimen: Blood Updated: 12/19/20 0938     Osmolality 259 mOsm/kg     Magnesium [[id number]]  (Normal) Collected: 12/19/20 0421    Specimen: Blood Updated: 12/19/20 0539     Magnesium 1.9 mg/dL     Basic Metabolic Panel [[id number]]  (Abnormal) Collected: 12/19/20 0421    Specimen: Blood Updated: 12/19/20 0509     Glucose 101 mg/dL      BUN 7 mg/dL      Creatinine 0.47 mg/dL      Sodium 124 mmol/L      Potassium 3.6 mmol/L      Chloride 92 mmol/L      CO2 24.0 mmol/L      Calcium 8.0 mg/dL      eGFR Non African Amer 132 mL/min/1.73      BUN/Creatinine Ratio 14.9     Anion Gap 8.0 mmol/L     Narrative:      GFR Normal >60  Chronic Kidney Disease <60  Kidney Failure <15      CBC Auto Differential [[id number]]  (Abnormal) Collected: 12/19/20 0421    Specimen: Blood Updated: 12/19/20 0452     WBC 8.00 10*3/mm3      RBC 3.72 10*6/mm3      Hemoglobin 12.1 g/dL      Hematocrit 34.7 %      MCV 93.0 fL      MCH 32.4 pg      MCHC 34.9 g/dL      RDW 13.9 %      RDW-SD 45.1 fl      MPV 6.0 fL      Platelets 236 10*3/mm3      Neutrophil % 76.3 %      Lymphocyte % 13.2 %      Monocyte % 9.5 %      Eosinophil % 0.7 %      Basophil % 0.3 %      Neutrophils, Absolute 6.10 10*3/mm3      Lymphocytes, Absolute 1.10 10*3/mm3      Monocytes, Absolute 0.80 10*3/mm3      Eosinophils, Absolute 0.10 10*3/mm3      Basophils, Absolute 0.00 10*3/mm3      nRBC 0.0 /100 WBC     Urine Drug Screen - Urine, Clean Catch [[id number]]  (Normal) Collected: 12/19/20 0211    Specimen: Urine, Clean Catch Updated: 12/19/20 0242     Amphet/Methamphet, Screen Negative     Barbiturates Screen, Urine Negative     Benzodiazepine  Screen, Urine Negative     Cocaine Screen, Urine Negative     Opiate Screen Negative     THC, Screen, Urine Negative     Methadone Screen, Urine Negative     Oxycodone Screen, Urine Negative    Narrative:      Negative Thresholds For Drugs Screened:     Amphetamines               500 ng/ml   Barbiturates               200 ng/ml   Benzodiazepines            100 ng/ml   Cocaine                    300 ng/ml   Methadone                  300 ng/ml   Opiates                    300 ng/ml   Oxycodone                  100 ng/ml   THC                        50 ng/ml    The Normal Value for all drugs tested is negative. This report includes final unconfirmed screening results to be used for medical treatment purposes only. Unconfirmed results must not be used for non-medical purposes such as employment or legal testing. Clinical consideration should be applied to any drug of abuse test, particulary when unconfirmed results are used.  All urine drugs of abuse requests without chain of custody are for medical screening purposes only.  False positives are possible.      Urinalysis, Microscopic Only - Urine, Clean Catch [[id number]]  (Abnormal) Collected: 12/19/20 0211    Specimen: Urine, Clean Catch Updated: 12/19/20 0229     RBC, UA 0-2 /HPF      WBC, UA 6-12 /HPF      Bacteria, UA None Seen /HPF      Squamous Epithelial Cells, UA 0-2 /HPF      Hyaline Casts, UA 3-6 /LPF      Methodology Automated Microscopy    Urinalysis With Culture If Indicated - Urine, Clean Catch [[id number]]  (Abnormal) Collected: 12/19/20 0211    Specimen: Urine, Clean Catch Updated: 12/19/20 0229     Color, UA Yellow     Appearance, UA Clear     pH, UA 6.5     Specific Gravity, UA 1.017     Glucose, UA Negative     Ketones, UA Trace     Bilirubin, UA Negative     Blood, UA Negative     Protein, UA 30 mg/dL (1+)     Leuk Esterase, UA Small (1+)     Nitrite, UA Positive     Urobilinogen, UA 1.0 E.U./dL    Urine Culture - Urine, Urine, Clean Catch [[id number]]  Collected: 12/19/20 0211    Specimen: Urine, Clean Catch Updated: 12/19/20 0229    Troponin [[id number]]  (Normal) Collected: 12/18/20 1524    Specimen: Blood Updated: 12/18/20 1614     Troponin T <0.010 ng/mL     Narrative:      Troponin T Reference Range:  <= 0.03 ng/mL-   Negative for AMI  >0.03 ng/mL-     Abnormal for myocardial necrosis.  Clinicians would have to utilize clinical acumen, EKG, Troponin and serial changes to determine if it is an Acute Myocardial Infarction or myocardial injury due to an underlying chronic condition.       Results may be falsely decreased if patient taking Biotin.      TSH [[id number]]  (Normal) Collected: 12/18/20 1524    Specimen: Blood Updated: 12/18/20 1614     TSH 2.510 uIU/mL     Lipid Panel [[id number]] Collected: 12/18/20 1524    Specimen: Blood Updated: 12/18/20 1611     Total Cholesterol 125 mg/dL      Triglycerides 60 mg/dL      HDL Cholesterol 55 mg/dL      LDL Cholesterol  57 mg/dL      VLDL Cholesterol 13 mg/dL      LDL/HDL Ratio 1.05    Narrative:      Cholesterol Reference Ranges  (U.S. Department of Health and Human Services ATP III Classifications)    Desirable          <200 mg/dL  Borderline High    200-239 mg/dL  High Risk          >240 mg/dL      Triglyceride Reference Ranges  (U.S. Department of Health and Human Services ATP III Classifications)    Normal           <150 mg/dL  Borderline High  150-199 mg/dL  High             200-499 mg/dL  Very High        >500 mg/dL    HDL Reference Ranges  (U.S. Department of Health and Human Services ATP III Classifcations)    Low     <40 mg/dl (major risk factor for CHD)  High    >60 mg/dl ('negative' risk factor for CHD)        LDL Reference Ranges  (U.S. Department of Health and Human Services ATP III Classifcations)    Optimal          <100 mg/dL  Near Optimal     100-129 mg/dL  Borderline High  130-159 mg/dL  High             160-189 mg/dL  Very High        >189 mg/dL    Comprehensive Metabolic Panel [[id number]]   (Abnormal) Collected: 12/18/20 1524    Specimen: Blood Updated: 12/18/20 1611     Glucose 96 mg/dL      BUN 7 mg/dL      Creatinine 0.53 mg/dL      Sodium 121 mmol/L      Potassium 3.9 mmol/L      Comment: Slight hemolysis detected by analyzer. Results may be affected.        Chloride 91 mmol/L      CO2 23.0 mmol/L      Calcium 8.4 mg/dL      Total Protein 5.3 g/dL      Albumin 3.40 g/dL      ALT (SGPT) 15 U/L      AST (SGOT) 28 U/L      Alkaline Phosphatase 90 U/L      Total Bilirubin 0.9 mg/dL      eGFR Non African Amer 115 mL/min/1.73      Globulin 1.9 gm/dL      A/G Ratio 1.8 g/dL      BUN/Creatinine Ratio 13.2     Anion Gap 7.0 mmol/L     Narrative:      GFR Normal >60  Chronic Kidney Disease <60  Kidney Failure <15      Magnesium [[id number]]  (Abnormal) Collected: 12/18/20 1524    Specimen: Blood Updated: 12/18/20 1611     Magnesium 1.5 mg/dL     CBC Auto Differential [[id number]]  (Abnormal) Collected: 12/18/20 1524    Specimen: Blood Updated: 12/18/20 1551     WBC 8.70 10*3/mm3      RBC 3.90 10*6/mm3      Hemoglobin 12.7 g/dL      Hematocrit 36.9 %      MCV 94.5 fL      MCH 32.4 pg      MCHC 34.3 g/dL      RDW 13.9 %      RDW-SD 44.6 fl      MPV 6.3 fL      Platelets 276 10*3/mm3      Neutrophil % 81.9 %      Lymphocyte % 10.3 %      Monocyte % 7.3 %      Eosinophil % 0.3 %      Basophil % 0.2 %      Neutrophils, Absolute 7.20 10*3/mm3      Lymphocytes, Absolute 0.90 10*3/mm3      Monocytes, Absolute 0.60 10*3/mm3      Eosinophils, Absolute 0.00 10*3/mm3      Basophils, Absolute 0.00 10*3/mm3      nRBC 0.1 /100 WBC         No results found for: HGBA1C            No results found for: LIPASE  Lab Results   Component Value Date    CHOL 125 12/18/2020    TRIG 60 12/18/2020    HDL 55 12/18/2020    LDL 57 12/18/2020       No results found for: INTRAOP, PREDX, FINALDX, COMDX    Microbiology Results (last 10 days)     ** No results found for the last 240 hours. **          ECG/EMG Results (most recent)     Procedure  Component Value Units Date/Time    ECG 12 Lead [[id number]] Collected: 12/18/20 1641     Updated: 12/18/20 1642     QT Interval 376 ms     Narrative:      HEART RATE= 88  bpm  RR Interval= 680  ms  CT Interval= 155  ms  P Horizontal Axis= -27  deg  P Front Axis= 41  deg  QRSD Interval= 105  ms  QT Interval= 376  ms  QRS Axis= -8  deg  T Wave Axis= 144  deg  - ABNORMAL ECG -  Sinus rhythm  Probable left atrial enlargement  LVH with secondary repolarization abnormality  Probable anterior infarct, age indeterminate  No previous ECG available for comparison  Electronically Signed By:   Date and Time of Study: 2020-12-18 16:41:08                    Xr Chest 1 View    Result Date: 12/18/2020  No acute chest finding.  Electronically Signed By-Libby Mcclure MD On:12/18/2020 3:50 PM This report was finalized on [id number] by  Libby Mcclure MD.          Xrays, labs reviewed personally by physician.    Medication Review:   I have reviewed the patient's current medication list      Scheduled Meds  amLODIPine, 10 mg, Oral, Q24H  atorvastatin, 40 mg, Oral, Nightly  citalopram, 20 mg, Oral, Daily  docusate sodium, 100 mg, Oral, BID  enoxaparin, 40 mg, Subcutaneous, Q24H  levothyroxine, 75 mcg, Oral, Q AM  lisinopril, 40 mg, Oral, Daily  magnesium oxide, 400 mg, Oral, Daily  risperiDONE, 1 mg, Oral, Daily  risperiDONE, 2 mg, Oral, Nightly  sodium chloride, 10 mL, Intravenous, Q12H        Meds Infusions  sodium chloride, 50 mL/hr, Last Rate: 50 mL/hr (12/19/20 1050)        Meds PRN  •  acetaminophen  •  ondansetron  •  sodium chloride        Assessment/Plan   Assessment/Plan     Active Hospital Problems    Diagnosis  POA   • Hyponatremia [E87.1]  Yes      Resolved Hospital Problems   No resolved problems to display.       MEDICAL DECISION MAKING COMPLEXITY BY PROBLEM:     AMS/acute toxic metabolic encephalopathy     -multifactorial, 2/2 hyponatremia, volume contraction, suspected UTI     Hyponatremia (maybe chronic )        -serum osmolality of 259, gentle  hydration with normal saline and monitor sodium closely        -if no improvement, or worsen will consider nephrology consultation and fluid restrict     Longstanding history of alcohol abuse     Accelerated hypertension      -increase lisinopril and added Norvasc     Bipolar disorder      -celexa/risperdal     Hypothyroidism      -Levothyroxine, tsh 2.5     Mixed hyperlipidemia       -Lipitor     Mechanical fall at home (with multiple bruises present on admission)/weakness       -tsh, b12,and folate within normal limits        -appreciate PT evaluation and recommendation            VTE Prophylaxis -   Mechanical Order History:     None      Pharmalogical Order History:      Ordered     Dose Route Frequency Stop    12/18/20 1514  enoxaparin (LOVENOX) syringe 40 mg      40 mg SC Every 24 Hours Scheduled --                  Code Status -   Code Status and Medical Interventions:   Ordered at: 12/18/20 1514     Level Of Support Discussed With:    Patient     Code Status:    CPR     Medical Interventions (Level of Support Prior to Arrest):    Full         Discharge Planning    Electronically signed by Lenard Renner MD, 12/19/20, 14:11 EST.  [facility] Hospitalist Team

## 2020-12-19 NOTE — THERAPY EVALUATION
Patient Name: Norma De Los Santos  : 1955    MRN: [id number]                              Today's Date: 2020       Admit Date: 2020    Visit Dx: No diagnosis found.  Patient Active Problem List   Diagnosis   • Hyponatremia     Past Medical History:   Diagnosis Date   • COPD (chronic obstructive pulmonary disease) (CMS/HCC)    • Disease of thyroid gland    • Elevated cholesterol    • GERD (gastroesophageal reflux disease)    • Hypertension      Past Surgical History:   Procedure Laterality Date   • BREAST LUMPECTOMY     • BREAST SURGERY     • DILATATION AND CURETTAGE     • FRACTURE SURGERY     • ORIF ELBOW FRACTURE     • WISDOM TOOTH EXTRACTION       General Information     Row Name 20 1133          Physical Therapy Time and Intention    Document Type  evaluation  -     Mode of Treatment  physical therapy  -     Row Name 20 1133          General Information    Patient Profile Reviewed  yes  -     Prior Level of Function  independent: reports normally ind at home  -     Existing Precautions/Restrictions  fall  -     Barriers to Rehab  none identified  -     Row Name 20 1133          Living Environment    Lives With  child(kiah), adult  -     Row Name 20 1133          Home Main Entrance    Number of Stairs, Main Entrance  none  -     Row Name 20 1133          Stairs Within Home, Primary    Number of Stairs, Within Home, Primary  none  -     Row Name 20 1133          Cognition    Orientation Status (Cognition)  oriented x 3  -     Row Name 20 1133          Safety Issues, Functional Mobility    Safety Issues Affecting Function (Mobility)  insight into deficits/self-awareness  -     Impairments Affecting Function (Mobility)  balance;endurance/activity tolerance;strength;range of motion (ROM);pain  -       User Key  (r) = Recorded By, (t) = Taken By, (c) = Cosigned By    Initials Name Provider Type     Leanne Fortune, PT Physical Therapist         Mobility     [facility] Name 12/19/20 1134          Bed Mobility    Bed Mobility  sit-supine  -     Supine-Sit [place] (Bed Mobility)  minimum assist (75% patient effort);1 person assist  -     Assistive Device (Bed Mobility)  bed rails;head of bed elevated  -[facility] Name 12/19/20 1134          Bed-Chair Transfer    Bed-Chair [place] (Transfers)  minimum assist (75% patient effort);2 person assist  -[facility] Name 12/19/20 1134          Sit-Stand Transfer    Sit-Stand [place] (Transfers)  minimum assist (75% patient effort);2 person assist  -[facility] Name 12/19/20 1134          Gait/Stairs (Locomotion)    [place] Level (Gait)  minimum assist (75% patient effort);2 person assist  -     Distance in Feet (Gait)  10 steps in room, up to chair.  hand held assit, left RW in room for future use  -     Deviations/Abnormal Patterns (Gait)  base of support, narrow posterior lean, unsteady  -     Bilateral Gait Deviations  heel strike decreased  -       User Key  (r) = Recorded By, (t) = Taken By, (c) = Cosigned By    Initials Name Provider Type     Leanne Fortune, PT Physical Therapist        Obj/Interventions     [facility] Name 12/19/20 1136          Range of Motion Comprehensive    Comment, General Range of Motion  see OT note for UE specifics,  pt with skin tears B hands, L elbow.  BLE AROM WFLs except lacking full knee extension bilaterally, tight heel cords  -[facility] Name 12/19/20 1136          Strength Comprehensive (MMT)    Comment, General Manual Muscle Testing (MMT) Assessment  BLEs 4-/5, lacking full knee extension  -JH     Row Name 12/19/20 1136          Balance    Balance Assessment  sitting static balance;sitting dynamic balance;standing static balance;standing dynamic balance  -     Static Sitting Balance  WFL;sitting, edge of bed  -     Dynamic Sitting Balance  mild impairment;sitting, edge of bed  -     Static Standing Balance  mild impairment;supported;standing  -      Dynamic Standing Balance  moderate impairment;supported;standing  -     Comment, Balance  posterior lean with static standing, LOB wtih eyes closed, challenged with SLS and heel raises in standing  -     Row Name 12/19/20 1136          Sensory Assessment (Somatosensory)    Sensory Assessment (Somatosensory)  sensation intact  -       User Key  (r) = Recorded By, (t) = Taken By, (c) = Cosigned By    Initials Name Provider Type     Leanne Fortune, PT Physical Therapist        Goals/Plan     Row Name 12/19/20 1142          Bed Mobility Goal 1 (PT)    Activity/Assistive Device (Bed Mobility Goal 1, PT)  bed mobility activities, all  -JH     [place] Level/Cues Needed (Bed Mobility Goal 1, PT)  independent  -     Time Frame (Bed Mobility Goal 1, PT)  long term goal (LTG);2 weeks  -[facility] Name 12/19/20 1142          Transfer Goal 1 (PT)    Activity/Assistive Device (Transfer Goal 1, PT)  sit-to-stand/stand-to-sit;bed-to-chair/chair-to-bed  -     [place] Level/Cues Needed (Transfer Goal 1, PT)  modified independence  -JH     Time Frame (Transfer Goal 1, PT)  long term goal (LTG);2 weeks  -[facility] Name 12/19/20 1142          Gait Training Goal 1 (PT)    Activity/Assistive Device (Gait Training Goal 1, PT)  gait (walking locomotion);assistive device use;walker, rolling  -     [place] Level (Gait Training Goal 1, PT)  contact guard assist  -     Distance (Gait Training Goal 1, PT)  150'  -JH     Time Frame (Gait Training Goal 1, PT)  long term goal (LTG);2 weeks  -       User Key  (r) = Recorded By, (t) = Taken By, (c) = Cosigned By    Initials Name Provider Type     Leanne Fortune, PT Physical Therapist        Clinical Impression     Row Name 12/19/20 1138          Pain Scale: FACES Pre/Post-Treatment    Pain: FACES Scale, Pretreatment  2-->hurts little bit  -     Posttreatment Pain Rating  4-->hurts little more  -     Pain Location - Side  Left  -     Pain Location  hip general  soreness from falls  -[facility] Name 12/19/20 1138          Plan of Care Review    Plan of Care Reviewed With  patient  -     Outcome Summary  66 yo female admitted with weakness and falls.  d/o UTI.  Pt reports being ind at home, no assistive device use, lives with her son.  Pt has multiple skin tears on UEs from falls, decreased functional ROM LUE from prior accident.  Pt with gait and balance deficits putting her at risk for falls.  Transfers OOB with min A x2.  Left RW in room for furture use.  Concern for pt at home in current condition, recommend IP rehab at d/c.  Discussed with pt and she is agreeable.  Will follow 5x/week.  PPE worn:  gloves, mask, goggles.  -[facility] Name 12/19/20 1138          Therapy Assessment/Plan (PT)    Rehab Potential (PT)  good, to achieve stated therapy goals  -     Criteria for Skilled Interventions Met (PT)  yes;skilled treatment is necessary  -[facility] Name 12/19/20 1138          Vital Signs    Pre Systolic BP Rehab  178  -     Pre Treatment Diastolic BP  98  -     Post Systolic BP Rehab  150  -     Post Treatment Diastolic BP  80  -     Pretreatment Heart Rate (beats/min)  87  -     Post SpO2 (%)  94  -     O2 Delivery Post Treatment  room air  -[facility] Name 12/19/20 1138          Positioning and Restraints    Pre-Treatment Position  in bed  -     Post Treatment Position  chair  -     In Chair  notified nsg;sitting;call light within reach;exit alarm on;encouraged to call for assist  -       User Key  (r) = Recorded By, (t) = Taken By, (c) = Cosigned By    Initials Name Provider Type     Leanne Fortune, PT Physical Therapist        Outcome Measures     [facility] Name 12/19/20 1146          Modified Reanna Scale    Modified Reanna Scale  4 - Moderately severe disability.  Unable to walk without assistance, and unable to attend to own bodily needs without assistance.  -[facility] Name 12/19/20 1143          Functional Assessment    Outcome Measure Options   Modified [place]  -       User Key  (r) = Recorded By, (t) = Taken By, (c) = Cosigned By    Initials Name Provider Type     Leanne Fortune PT Physical Therapist        Physical Therapy Education                 Title: PT OT SLP Therapies (In Progress)     Topic: Physical Therapy (In Progress)     Point: Mobility training (In Progress)     Learning Progress Summary           Patient Acceptance, E, NR by  at 12/19/2020 1143                   Point: Precautions (In Progress)     Learning Progress Summary           Patient Acceptance, E, NR by  at 12/19/2020 1143                               User Key     Initials Effective Dates Name Provider Type [facility] 03/01/19 -  Leanne Fortune PT Physical Therapist PT              PT Recommendation and Plan  Planned Therapy Interventions (PT): balance training, bed mobility training, gait training, transfer training, postural re-education, ROM (range of motion), strengthening, patient/family education  Plan of Care Reviewed With: patient  Outcome Summary: 66 yo female admitted with weakness and falls.  d/o UTI.  Pt reports being ind at home, no assistive device use, lives with her son.  Pt has multiple skin tears on UEs from falls, decreased functional ROM LUE from prior accident.  Pt with gait and balance deficits putting her at risk for falls.  Transfers OOB with min A x2.  Left RW in room for furture use.  Concern for pt at home in current condition, recommend IP rehab at d/c.  Discussed with pt and she is agreeable.  Will follow 5x/week.  PPE worn:  gloves, mask, goggles.     Time Calculation:   PT Charges     Row Name 12/19/20 1143             Time Calculation    Start Time  0847  -      Stop Time  0907  -      Time Calculation (min)  20 min  -      PT Received On  12/19/20  -      PT - Next Appointment  12/21/20  -      PT Goal Re-Cert Due Date  01/02/21  -         Time Calculation- PT    Total Timed Code Minutes- PT  0 minute(s)  -        User  Key  (r) = Recorded By, (t) = Taken By, (c) = Cosigned By    Initials Name Provider Type    Leanne Aguirre, PT Physical Therapist        Therapy Charges for Today     Code Description Service Date Service Provider Modifiers Qty    [id number]  PT EVAL MOD COMPLEXITY 3 12/19/2020 Leanne Fortune, PT GP 1          PT G-Codes  Outcome Measure Options: Modified Reanna  Modified Reanna Scale: 4 - Moderately severe disability.  Unable to walk without assistance, and unable to attend to own bodily needs without assistance.    Leanne Fortune PT  12/19/2020

## 2020-12-19 NOTE — PROGRESS NOTES
Continued Stay Note   Sha     Patient Name: Norma De Los Santos  MRN: [id number]  Today's Date: 12/19/2020    Admit Date: 12/18/2020    Discharge Plan     Row Name 12/19/20 1032       Plan    Plan  [facility] referral pending. May require pre-cert. PASRR approved per CMS 1135 waiver.     Provided Post Acute Provider List?  Yes    Post Acute Provider List  Inpatient Rehab;Nursing Home    Delivered To  Patient    Method of Delivery  In person    Patient/Family in Agreement with Plan  yes    Plan Comments  SW met w/ pt at bedside to discuss inpt rehab placement (appropriate PPE worn: mask & goggles, maintained distance of at least 6 ft throughout, total visit less than 15 minutes). Pt requested placement at [facility] as she has been there in the past. SW provided ECF list in case alternate choices are needed. Referal made to [facility] via Epic and text message.        Vilma Acotsa, JOESPHW, LSW  PRN   Phone: [phone]

## 2020-12-20 LAB
ANION GAP SERPL CALCULATED.3IONS-SCNC: 5 MMOL/L (ref 5–15)
BACTERIA SPEC AEROBE CULT: NO GROWTH
BUN SERPL-MCNC: 6 MG/DL (ref 8–23)
BUN/CREAT SERPL: 11.5 (ref 7–25)
CALCIUM SPEC-SCNC: 8 MG/DL (ref 8.6–10.5)
CHLORIDE SERPL-SCNC: 99 MMOL/L (ref 98–107)
CO2 SERPL-SCNC: 26 MMOL/L (ref 22–29)
CREAT SERPL-MCNC: 0.52 MG/DL (ref 0.57–1)
GFR SERPL CREATININE-BSD FRML MDRD: 118 ML/MIN/1.73
GLUCOSE SERPL-MCNC: 98 MG/DL (ref 65–99)
POTASSIUM SERPL-SCNC: 3.9 MMOL/L (ref 3.5–5.2)
SODIUM SERPL-SCNC: 130 MMOL/L (ref 136–145)

## 2020-12-20 PROCEDURE — 99232 SBSQ HOSP IP/OBS MODERATE 35: CPT | Performed by: INTERNAL MEDICINE

## 2020-12-20 PROCEDURE — 97116 GAIT TRAINING THERAPY: CPT

## 2020-12-20 PROCEDURE — 97112 NEUROMUSCULAR REEDUCATION: CPT

## 2020-12-20 PROCEDURE — 25010000002 ENOXAPARIN PER 10 MG: Performed by: INTERNAL MEDICINE

## 2020-12-20 PROCEDURE — 80048 BASIC METABOLIC PNL TOTAL CA: CPT | Performed by: INTERNAL MEDICINE

## 2020-12-20 RX ADMIN — RISPERIDONE 2 MG: 1 TABLET ORAL at 21:24

## 2020-12-20 RX ADMIN — LISINOPRIL 40 MG: 20 TABLET ORAL at 10:27

## 2020-12-20 RX ADMIN — ATORVASTATIN CALCIUM 40 MG: 40 TABLET, FILM COATED ORAL at 21:24

## 2020-12-20 RX ADMIN — LEVOTHYROXINE SODIUM 75 MCG: 0.07 TABLET ORAL at 06:00

## 2020-12-20 RX ADMIN — DOCUSATE SODIUM 100 MG: 100 CAPSULE, LIQUID FILLED ORAL at 10:27

## 2020-12-20 RX ADMIN — DOCUSATE SODIUM 100 MG: 100 CAPSULE, LIQUID FILLED ORAL at 21:24

## 2020-12-20 RX ADMIN — ENOXAPARIN SODIUM 40 MG: 40 INJECTION SUBCUTANEOUS at 17:15

## 2020-12-20 RX ADMIN — SODIUM CHLORIDE 50 ML/HR: 9 INJECTION, SOLUTION INTRAVENOUS at 06:27

## 2020-12-20 RX ADMIN — AMLODIPINE BESYLATE 10 MG: 5 TABLET ORAL at 10:28

## 2020-12-20 RX ADMIN — RISPERIDONE 1 MG: 1 TABLET ORAL at 10:27

## 2020-12-20 RX ADMIN — Medication 10 ML: at 21:24

## 2020-12-20 RX ADMIN — CITALOPRAM HYDROBROMIDE 20 MG: 20 TABLET ORAL at 10:28

## 2020-12-20 RX ADMIN — Medication 10 ML: at 10:26

## 2020-12-20 RX ADMIN — MAGNESIUM GLUCONATE 500 MG ORAL TABLET 400 MG: 500 TABLET ORAL at 10:27

## 2020-12-20 NOTE — PROGRESS NOTES
"      [facility] Medicine Services Daily Progress Note      Hospitalist Team  LOS 2 days      Patient Care Team:  Chavo Shields as PCP - General (Family Medicine)    Patient Location: 267/1      Subjective   Subjective   Resting in bed in no distress or discomfort.  Chief Complaint / Subjective  No chief complaint on file.        Brief Synopsis of Hospital Course/HPI  65-year-old frail-appearing female with history of alcohol abuse (last alcohol use was 9 months ago), bipolar disorder, essential hypertension, hyperlipidemia, multiple falls in the past few months and hypothyroidism.  She was admitted in transfer from [facility] ([facility], where she presented with acute mental status changes/encephalopathy.  [facility] showed hyponatremia, with sodium of 122, toxicology screen unremarkable and alcohol level within normal limits.  She reportedly tested negative for Covid-19.  Chest x-ray showed no acute cardiopulmonary process.  Urine analysis suggestive for urinary tract infection, but denies dysuria or flank pain.     On arrival to [facility], she was hypertensive with blood pressure of 186/172 mmHg.   She denies chest pain, no shortness of breath, fever, chills, no dysuria, abdominal pain, nausea,  vomiting or diarrhea.  She endorses generalized weakness with unsteady gait and poor appetite and oral intake.  Laboratory @ [facility]  is remarkable for hyponatremia, with sodium of 121, otherwise CBC is unremarkable.          Objective   Objective    Seen and examined in follow evaluation.  Vital Signs  Temp:  [97.6 °F (36.4 °C)-98.6 °F (37 °C)] 98.1 °F (36.7 °C)  Heart Rate:  [80-87] 86  Resp:  [18-24] 18  BP: (120-154)/(62-88) 137/62  Oxygen Therapy  SpO2: 99 %  Pulse Oximetry Type: Continuous  Device (Oxygen Therapy): nasal cannula  Flow (L/min): 2  Flowsheet Rows      First Filed Value   Admission Height  180.3 cm (71\") Documented at 12/18/2020 1900   Admission Weight  67.1 kg (147 lb 14.9 oz) Documented " at 12/18/2020 1900        Intake & Output (last 3 days)       12/17 0701 - 12/18 0700 12/18 0701 - 12/19 0700 12/19 0701 - 12/20 0700 12/20 0701 - 12/21 0700    P.O.  444 1640 258    I.V. (mL/kg)  1000 (14.9)      Total Intake(mL/kg)  1444 (21.5) 1640 (24.4) 258 (3.8)    Net  +1444 +1640 +258            Urine Unmeasured Occurrence  4 x 1 x     Stool Unmeasured Occurrence   1 x         Lines, Drains & Airways    Active LDAs     Name:   Placement date:   Placement time:   Site:   Days:    Peripheral IV (Ped/Tyler) 12/18/20 Right;Anterior Forearm   12/18/20    0330     2                  Physical Exam  Constitutional:       General: She is not in acute distress.     Appearance: Normal appearance. She is not ill-appearing.   HENT:      Head: Normocephalic and atraumatic.      Mouth/Throat:      Mouth: Mucous membranes are dry.   Eyes:      Extraocular Movements: Extraocular movements intact.      Pupils: Pupils are equal, round, and reactive to light.   Neck:      Musculoskeletal: Normal range of motion and neck supple. No neck rigidity or muscular tenderness.   Cardiovascular:      Rate and Rhythm: Normal rate and regular rhythm.      Pulses: Normal pulses.      Heart sounds: No murmur. No gallop.    Pulmonary:      Effort: Pulmonary effort is normal.      Breath sounds: Normal breath sounds.   Abdominal:      General: Abdomen is flat.   Musculoskeletal: Normal range of motion.   Skin:     General: Skin is warm.   Neurological:      Mental Status: She is alert. Mental status is at baseline.   Psychiatric:         Mood and Affect: Mood normal.         Behavior: Behavior normal.         Procedures:    Results Review:     I reviewed the patient's new clinical results.      Lab Results (last 24 hours)     Procedure Component Value Units Date/Time    Urine Culture - Urine, Urine, Clean Catch [[id number]]  (Normal) Collected: 12/19/20 0211    Specimen: Urine, Clean Catch Updated: 12/20/20 1046     Urine Culture No growth     Basic Metabolic Panel [[id number]]  (Abnormal) Collected: 12/20/20 0350    Specimen: Blood Updated: 12/20/20 0436     Glucose 98 mg/dL      BUN 6 mg/dL      Creatinine 0.52 mg/dL      Sodium 130 mmol/L      Potassium 3.9 mmol/L      Comment: Slight hemolysis detected by analyzer. Results may be affected.        Chloride 99 mmol/L      CO2 26.0 mmol/L      Calcium 8.0 mg/dL      eGFR Non African Amer 118 mL/min/1.73      BUN/Creatinine Ratio 11.5     Anion Gap 5.0 mmol/L     Narrative:      GFR Normal >60  Chronic Kidney Disease <60  Kidney Failure <15          No results found for: HGBA1C            No results found for: LIPASE  Lab Results   Component Value Date    CHOL 125 12/18/2020    TRIG 60 12/18/2020    HDL 55 12/18/2020    LDL 57 12/18/2020       No results found for: INTRAOP, PREDX, FINALDX, COMDX    Microbiology Results (last 10 days)     Procedure Component Value - Date/Time    Urine Culture - Urine, Urine, Clean Catch [[id number]]  (Normal) Collected: 12/19/20 0211    Lab Status: Final result Specimen: Urine, Clean Catch Updated: 12/20/20 1046     Urine Culture No growth          ECG/EMG Results (most recent)     Procedure Component Value Units Date/Time    ECG 12 Lead [[id number]] Collected: 12/18/20 1641     Updated: 12/19/20 1759     QT Interval 376 ms     Narrative:      HEART RATE= 88  bpm  RR Interval= 680  ms  ME Interval= 155  ms  P Horizontal Axis= -27  deg  P Front Axis= 41  deg  QRSD Interval= 105  ms  QT Interval= 376  ms  QRS Axis= -8  deg  T Wave Axis= 144  deg  - ABNORMAL ECG -  Sinus rhythm  Probable left atrial enlargement  LVH with secondary repolarization abnormality  Probable anterior infarct, age indeterminate  No previous ECG available for comparison  Electronically Signed By: Ander Obrien ([facility]) 19-Dec-2020 17:58:56  Date and Time of Study: 2020-12-18 16:41:08                    Xr Chest 1 View    Result Date: 12/18/2020  No acute chest finding.  Electronically Signed ByDc  MD Kami On:12/18/2020 3:50 PM This report was finalized on [id number] by  Libby Mcclure MD.          Xrays, labs reviewed personally by physician.    Medication Review:   I have reviewed the patient's current medication list      Scheduled Meds  amLODIPine, 10 mg, Oral, Q24H  atorvastatin, 40 mg, Oral, Nightly  citalopram, 20 mg, Oral, Daily  docusate sodium, 100 mg, Oral, BID  enoxaparin, 40 mg, Subcutaneous, Q24H  levothyroxine, 75 mcg, Oral, Q AM  lisinopril, 40 mg, Oral, Daily  magnesium oxide, 400 mg, Oral, Daily  risperiDONE, 1 mg, Oral, Daily  risperiDONE, 2 mg, Oral, Nightly  sodium chloride, 10 mL, Intravenous, Q12H        Meds Infusions  sodium chloride, 50 mL/hr, Last Rate: 50 mL/hr (12/20/20 0627)        Meds PRN  •  acetaminophen  •  ondansetron  •  sodium chloride        Assessment/Plan   Assessment/Plan     Active Hospital Problems    Diagnosis  POA   • Weakness [R53.1]  Unknown   • Hyponatremia [E87.1]  Yes      Resolved Hospital Problems   No resolved problems to display.       MEDICAL DECISION MAKING COMPLEXITY BY PROBLEM:     AMS/acute toxic metabolic encephalopathy     -improved mentation, multifactorial--> 2/2 hyponatremia, suspected UTI     Hyponatremia (maybe chronic )       -improved, gentle  hydration with normal saline and monitor closely        -tsh 2.5     Longstanding history of alcohol abuse     Accelerated hypertension      -increase lisinopril and added Norvasc     Bipolar disorder      -celexa/risperdal     Hypothyroidism      -Levothyroxine, tsh 2.5     Mixed hyperlipidemia       -Lipitor     Mechanical fall at home (with multiple bruises present on admission)/weakness       -tsh, b12,and folate within normal limits        -cont PT as tolerated    Disposition: Possible to SNF in 2 days      VTE Prophylaxis -   Mechanical Order History:     None      Pharmalogical Order History:      Ordered     Dose Route Frequency Stop    12/18/20 7334  enoxaparin (LOVENOX) syringe 40 mg       40 mg SC Every 24 Hours Scheduled --                  Code Status -   Code Status and Medical Interventions:   Ordered at: 12/18/20 1514     Level Of Support Discussed With:    Patient     Code Status:    CPR     Medical Interventions (Level of Support Prior to Arrest):    Full         Discharge Planning    Electronically signed by Lenard Renner MD, 12/20/20, 13:32 EST.  [facility] Hospitalist Team

## 2020-12-20 NOTE — PLAN OF CARE
Goal Outcome Evaluation:  Plan of Care Reviewed With: patient  Progress: no change     Pt is 66 y/o F admitted for hyponatremia, weakness/Falls, UTI. Pt is on NS running at 50 ml/hr, A/O x 4, calm, cooperative. Pt on 2 L O2 @ HS. O2 stats can drop into the upper 80s at times this shift. Pt moves the nasal cannula around a lot. Rate is NSR. B/P has been stable this shift. Pt has abrasions, bruises, and skin tears from falls prior to admit. Hands are wrapped bilaterally. Urine CX is pending. CXR neg, Trop neg, PT recommends IP rehab. Pt from home alone. IPR is [facility]. Vitals have been stable. Will cont to monitor pt throughout the night.      Problem: Adult Inpatient Plan of Care  Goal: Plan of Care Review  Outcome: Ongoing, Progressing  Goal: Patient-Specific Goal (Individualized)  Outcome: Ongoing, Progressing  Goal: Absence of Hospital-Acquired Illness or Injury  Outcome: Ongoing, Progressing  Intervention: Identify and Manage Fall Risk  Recent Flowsheet Documentation  Taken 12/20/2020 0200 by Ye Berrios RN  Safety Promotion/Fall Prevention:   safety round/check completed   room organization consistent   nonskid shoes/slippers when out of bed   fall prevention program maintained   clutter free environment maintained   assistive device/personal items within reach   activity supervised  Taken 12/20/2020 0000 by Ye Berrios RN  Safety Promotion/Fall Prevention:   safety round/check completed   room organization consistent   nonskid shoes/slippers when out of bed   lighting adjusted   fall prevention program maintained   clutter free environment maintained   assistive device/personal items within reach   activity supervised  Taken 12/19/2020 2200 by Ye Berrios RN  Safety Promotion/Fall Prevention:   safety round/check completed   room organization consistent   nonskid shoes/slippers when out of bed   fall prevention program maintained   clutter free environment maintained   assistive  device/personal items within reach  Taken 12/19/2020 2000 by Ye Berrios RN  Safety Promotion/Fall Prevention:   safety round/check completed   room organization consistent   nonskid shoes/slippers when out of bed   lighting adjusted   fall prevention program maintained   clutter free environment maintained   assistive device/personal items within reach   activity supervised  Intervention: Prevent and Manage VTE (venous thromboembolism) Risk  Recent Flowsheet Documentation  Taken 12/19/2020 2000 by Ye Berrios RN  VTE Prevention/Management:   bilateral   sequential compression devices off   patient refused intervention  Intervention: Prevent Infection  Recent Flowsheet Documentation  Taken 12/20/2020 0200 by Ye Berrios RN  Infection Prevention:   hand hygiene promoted   rest/sleep promoted  Taken 12/20/2020 0000 by Ye Berrios RN  Infection Prevention:   hand hygiene promoted   personal protective equipment utilized   rest/sleep promoted  Taken 12/19/2020 2200 by Ye Berrios RN  Infection Prevention:   hand hygiene promoted   rest/sleep promoted  Taken 12/19/2020 2000 by Ye Berrios RN  Infection Prevention:   hand hygiene promoted   personal protective equipment utilized   rest/sleep promoted  Goal: Optimal Comfort and Wellbeing  Outcome: Ongoing, Progressing  Intervention: Provide Person-Centered Care  Recent Flowsheet Documentation  Taken 12/19/2020 2000 by Ye Berrios RN  Trust Relationship/Rapport:   care explained   choices provided   empathic listening provided   emotional support provided   questions answered   questions encouraged   thoughts/feelings acknowledged   reassurance provided  Goal: Readiness for Transition of Care  Outcome: Ongoing, Progressing     Problem: Fall Injury Risk  Goal: Absence of Fall and Fall-Related Injury  Outcome: Ongoing, Progressing  Intervention: Identify and Manage Contributors to Fall Injury Risk  Recent Flowsheet  Documentation  Taken 12/20/2020 0000 by Ye Berrios RN  Medication Review/Management: medications reviewed  Taken 12/19/2020 2000 by Ye Berrios RN  Medication Review/Management: medications reviewed  Intervention: Promote Injury-Free Environment  Recent Flowsheet Documentation  Taken 12/20/2020 0200 by Ye Berrios RN  Safety Promotion/Fall Prevention:   safety round/check completed   room organization consistent   nonskid shoes/slippers when out of bed   fall prevention program maintained   clutter free environment maintained   assistive device/personal items within reach   activity supervised  Taken 12/20/2020 0000 by Ye Berrios RN  Safety Promotion/Fall Prevention:   safety round/check completed   room organization consistent   nonskid shoes/slippers when out of bed   lighting adjusted   fall prevention program maintained   clutter free environment maintained   assistive device/personal items within reach   activity supervised  Taken 12/19/2020 2200 by Ye Berrios RN  Safety Promotion/Fall Prevention:   safety round/check completed   room organization consistent   nonskid shoes/slippers when out of bed   fall prevention program maintained   clutter free environment maintained   assistive device/personal items within reach  Taken 12/19/2020 2000 by Ye Berrios RN  Safety Promotion/Fall Prevention:   safety round/check completed   room organization consistent   nonskid shoes/slippers when out of bed   lighting adjusted   fall prevention program maintained   clutter free environment maintained   assistive device/personal items within reach   activity supervised     Problem: Skin Injury Risk Increased  Goal: Skin Health and Integrity  Outcome: Ongoing, Progressing  Intervention: Optimize Skin Protection  Recent Flowsheet Documentation  Taken 12/20/2020 0000 by Ye Berrios RN  Pressure Reduction Techniques:   frequent weight shift encouraged   heels elevated off bed    positioned off wounds   weight shift assistance provided  Pressure Reduction Devices:   positioning supports utilized   pressure-redistributing mattress utilized  Skin Protection:   incontinence pads utilized   skin-to-device areas padded   tubing/devices free from skin contact   pectin skin barriers applied  Taken 12/19/2020 2000 by Ye Berrios RN  Pressure Reduction Techniques:   frequent weight shift encouraged   weight shift assistance provided  Pressure Reduction Devices:   positioning supports utilized   pressure-redistributing mattress utilized  Skin Protection:   incontinence pads utilized   skin-to-device areas padded   tubing/devices free from skin contact     Problem: Electrolyte Imbalance  Goal: Electrolyte Balance  Outcome: Ongoing, Progressing  Intervention: Monitor and Manage Electrolyte Imbalance  Recent Flowsheet Documentation  Taken 12/20/2020 0000 by Ye Berrios RN  Fluid/Electrolyte Management: fluids provided  Taken 12/19/2020 2000 by Ye Berrios RN  Fluid/Electrolyte Management: fluids provided

## 2020-12-20 NOTE — PLAN OF CARE
Goal Outcome Evaluation:  Plan of Care Reviewed With: patient  Progress: improving  Outcome Summary: pt able to amb using rwx x 70' with cga and vc's for direction, easily challenged when turning and jada when backing up to sit, Risk for falls. Will need rehab at [place]. Gloves,mask/goggles worn for tx

## 2020-12-20 NOTE — PLAN OF CARE
Goal Outcome Evaluation:  Plan of Care Reviewed With: patient  Progress: no change     Patient doing very well today. She has been up and in chair majority of day, she also had a BM today, will continue to monitor.

## 2020-12-20 NOTE — THERAPY TREATMENT NOTE
Patient Name: Norma De Los Santos  : 1955    MRN: [id number]                              Today's Date: 2020       Admit Date: 2020    Visit Dx: No diagnosis found.  Patient Active Problem List   Diagnosis   • Hyponatremia   • Weakness     Past Medical History:   Diagnosis Date   • COPD (chronic obstructive pulmonary disease) (CMS/HCC)    • Disease of thyroid gland    • Elevated cholesterol    • GERD (gastroesophageal reflux disease)    • Hypertension      Past Surgical History:   Procedure Laterality Date   • BREAST LUMPECTOMY     • BREAST SURGERY     • DILATATION AND CURETTAGE     • FRACTURE SURGERY     • ORIF ELBOW FRACTURE     • WISDOM TOOTH EXTRACTION       General Information     Row Name 20 1800          Physical Therapy Time and Intention    Document Type  therapy note (daily note)  -     Mode of Treatment  physical therapy  -     Row Name 20 1800          General Information    Patient Profile Reviewed  yes  -     Existing Precautions/Restrictions  fall  -     Row Name 20 1800          Safety Issues, Functional Mobility    Safety Issues Affecting Function (Mobility)  impulsivity;judgment;insight into deficits/self-awareness;awareness of need for assistance  -     Impairments Affecting Function (Mobility)  balance;endurance/activity tolerance;postural/trunk control;strength;range of motion (ROM)  -       User Key  (r) = Recorded By, (t) = Taken By, (c) = Cosigned By    Initials Name Provider Type     Isa lSade PTA Physical Therapy Assistant        Mobility     Row Name 20 180          Bed Mobility    Comment (Bed Mobility)  pt up in chair  -     Row Name 20 180          Sit-Stand Transfer    Sit-Stand [place] (Transfers)  verbal cues;contact guard  -     Assistive Device (Sit-Stand Transfers)  walker, front-wheeled  -     Row Name 20 180          Gait/Stairs (Locomotion)    [place] Level (Gait)  verbal cues;contact  guard  -     Assistive Device (Gait)  walker, front-wheeled  -     Distance in Feet (Gait)  70  -     Comment (Gait/Stairs)  decreased stride/heel strike. NBOS, easily challenged jada when turning and backing up to sit. Req min assist to control sitting in chair at end of session  -       User Key  (r) = Recorded By, (t) = Taken By, (c) = Cosigned By    Initials Name Provider Type    Isa Burt PTA Physical Therapy Assistant        Obj/Interventions     Row Name 12/20/20 1802          Range of Motion Comprehensive    General Range of Motion  upper extremity range of motion deficits identified  -     Row Name 12/20/20 1802          Strength Comprehensive (MMT)    General Manual Muscle Testing (MMT) Assessment  lower extremity strength deficits identified  -[facility] Name 12/20/20 1802          Balance    Balance Assessment  sitting static balance;standing dynamic balance;sitting dynamic balance;standing static balance  -     Static Sitting Balance  WFL;sitting in chair  -     Dynamic Sitting Balance  mild impairment;sitting in chair  -     Static Standing Balance  mild impairment;supported  -     Dynamic Standing Balance  mild impairment;supported  -       User Key  (r) = Recorded By, (t) = Taken By, (c) = Cosigned By    Initials Name Provider Type     Isa Slade PTA Physical Therapy Assistant        Goals/Plan     [facility] Name 12/20/20 1805          Bed Mobility Goal 1 (PT)    Progress/Outcomes (Bed Mobility Goal 1, PT)  goal ongoing  -[facility] Name 12/20/20 1805          Transfer Goal 1 (PT)    Progress/Outcome (Transfer Goal 1, PT)  goal ongoing  -[facility] Name 12/20/20 1805          Gait Training Goal 1 (PT)    Progress/Outcome (Gait Training Goal 1, PT)  goal ongoing  -     Row Name 12/20/20 1805          Patient Education Goal (PT)    Progress/Outcome (Patient Education Goal, PT)  goal ongoing  -       User Key  (r) = Recorded By, (t) = Taken By, (c) = Cosigned By     Initials Name Provider Type    Isa Burt PTA Physical Therapy Assistant        Clinical Impression     Row Name 12/20/20 1803          Pain    Additional Documentation  Pain Scale: Numbers Pre/Post-Treatment (Group)  -[facility] Name 12/20/20 1803          Pain Scale: Numbers Pre/Post-Treatment    Posttreatment Pain Rating  0/10 - no pain  -[facility] Name 12/20/20 1803          Plan of Care Review    Plan of Care Reviewed With  patient  -     Progress  improving  -     Outcome Summary  pt able to amb using rwx x 70' with cga and vc's for direction, easily challenged when turning and jada when backing up to sit, Risk for falls. Will need rehab at [place]. Gloves,mask/goggles worn for tx  -     Row Name 12/20/20 1803          Therapy Assessment/Plan (PT)    Rehab Potential (PT)  good, to achieve stated therapy goals  -     Criteria for Skilled Interventions Met (PT)  skilled treatment is necessary  -     Row Name 12/20/20 1803          Vital Signs    Pretreatment Heart Rate (beats/min)  81  -[facility]     Pre SpO2 (%)  100  -[facility]     O2 Delivery Pre Treatment  nasal cannula 2L  -[facility]     Intra SpO2 (%)  90  -[facility]     O2 Delivery Intra Treatment  room air  -     O2 Delivery Post Treatment  nasal cannula 2L  -[facility]       User Key  (r) = Recorded By, (t) = Taken By, (c) = Cosigned By    Initials Name Provider Type    Isa Burt PTA Physical Therapy Assistant        Outcome Measures     Row Name 12/20/20 1806          Modified [place] Scale    Modified [place] Scale  4 - Moderately severe disability.  Unable to walk without assistance, and unable to attend to own bodily needs without assistance.  -       User Key  (r) = Recorded By, (t) = Taken By, (c) = Cosigned By    Initials Name Provider Type    Isa Burt PTA Physical Therapy Assistant        Physical Therapy Education                 Title: PT OT SLP Therapies (Done)     Topic: Physical Therapy (Done)     Point: Mobility training (Done)      Learning Progress Summary           Patient Acceptance, E,TB, VU,NR by  at 12/20/2020 1806    Acceptance, E,TB, NR,VU by  at 12/19/2020 2207    Acceptance, E, NR by  at 12/19/2020 1143                   Point: Precautions (Done)     Learning Progress Summary           Patient Acceptance, E,TB, VU,NR by  at 12/20/2020 1806    Acceptance, E,TB, NR,VU by  at 12/19/2020 2207    Acceptance, E, NR by  at 12/19/2020 1143                               User Key     Initials Effective Dates Name Provider Type Discipline     03/01/19 -  Leanne Fortune, PT Physical Therapist PT     03/01/19 -  Isa Slade PTA Physical Therapy Assistant PT     07/10/20 -  Ye Berrios, RN Registered Nurse Nurse              PT Recommendation and Plan  Planned Therapy Interventions (PT): balance training, bed mobility training, gait training, patient/family education, transfer training, strengthening, postural re-education  Plan of Care Reviewed With: patient  Progress: improving  Outcome Summary: pt able to amb using rwx x 70' with cga and vc's for direction, easily challenged when turning and jada when backing up to sit, Risk for falls. Will need rehab at [place]. Gloves,mask/goggles worn for tx     Time Calculation:   PT Charges     Row Name 12/20/20 1807             Time Calculation    Start Time  1727  -      Stop Time  1750  -      Time Calculation (min)  23 min  -      PT Received On  12/20/20  -      PT - Next Appointment  12/21/20  -         Time Calculation- PT    Total Timed Code Minutes- PT  23 minute(s)  -        User Key  (r) = Recorded By, (t) = Taken By, (c) = Cosigned By    Initials Name Provider Type     Isa Slade PTA Physical Therapy Assistant        Therapy Charges for Today     Code Description Service Date Service Provider Modifiers Qty    46193934263 HC GAIT TRAINING EA 15 MIN 12/20/2020 Isa Slade PTA GP 1    03038082827 HC PT NEUROMUSC RE EDUCATION EA 15 MIN 12/20/2020  Isa Slade, PTA GP 1          PT G-Codes  Outcome Measure Options: Modified [place]  Modified [place] Scale: 4 - Moderately severe disability.  Unable to walk without assistance, and unable to attend to own bodily needs without assistance.    Isa Slade, PTA  12/20/2020

## 2020-12-21 VITALS
SYSTOLIC BLOOD PRESSURE: 108 MMHG | OXYGEN SATURATION: 100 % | HEIGHT: 71 IN | DIASTOLIC BLOOD PRESSURE: 59 MMHG | HEART RATE: 91 BPM | BODY MASS INDEX: 20.77 KG/M2 | TEMPERATURE: 97.5 F | WEIGHT: 148.37 LBS | RESPIRATION RATE: 12 BRPM

## 2020-12-21 PROBLEM — R53.1 WEAKNESS: Status: RESOLVED | Noted: 2020-12-19 | Resolved: 2020-12-21

## 2020-12-21 PROBLEM — E87.1 HYPONATREMIA: Status: RESOLVED | Noted: 2020-12-18 | Resolved: 2020-12-21

## 2020-12-21 PROBLEM — G92.8 TOXIC METABOLIC ENCEPHALOPATHY: Status: ACTIVE | Noted: 2020-12-21

## 2020-12-21 PROBLEM — G92.8 TOXIC METABOLIC ENCEPHALOPATHY: Status: RESOLVED | Noted: 2020-12-21 | Resolved: 2020-12-21

## 2020-12-21 PROCEDURE — 97110 THERAPEUTIC EXERCISES: CPT

## 2020-12-21 PROCEDURE — 97535 SELF CARE MNGMENT TRAINING: CPT

## 2020-12-21 PROCEDURE — 25010000002 ENOXAPARIN PER 10 MG: Performed by: INTERNAL MEDICINE

## 2020-12-21 PROCEDURE — 99238 HOSP IP/OBS DSCHRG MGMT 30/<: CPT | Performed by: HOSPITALIST

## 2020-12-21 RX ORDER — PSEUDOEPHEDRINE HCL 30 MG
100 TABLET ORAL 2 TIMES DAILY
Start: 2020-12-21

## 2020-12-21 RX ORDER — LISINOPRIL 20 MG/1
40 TABLET ORAL DAILY
Qty: 3 TABLET | Refills: 0
Start: 2020-12-21

## 2020-12-21 RX ORDER — AMLODIPINE BESYLATE 10 MG/1
10 TABLET ORAL
Start: 2020-12-22

## 2020-12-21 RX ADMIN — DOCUSATE SODIUM 100 MG: 100 CAPSULE, LIQUID FILLED ORAL at 20:11

## 2020-12-21 RX ADMIN — ATORVASTATIN CALCIUM 40 MG: 40 TABLET, FILM COATED ORAL at 20:11

## 2020-12-21 RX ADMIN — ACETAMINOPHEN 650 MG: 325 TABLET, FILM COATED ORAL at 01:39

## 2020-12-21 RX ADMIN — AMLODIPINE BESYLATE 10 MG: 5 TABLET ORAL at 08:54

## 2020-12-21 RX ADMIN — RISPERIDONE 2 MG: 1 TABLET ORAL at 20:11

## 2020-12-21 RX ADMIN — DOCUSATE SODIUM 100 MG: 100 CAPSULE, LIQUID FILLED ORAL at 08:54

## 2020-12-21 RX ADMIN — MAGNESIUM GLUCONATE 500 MG ORAL TABLET 400 MG: 500 TABLET ORAL at 08:54

## 2020-12-21 RX ADMIN — CITALOPRAM HYDROBROMIDE 20 MG: 20 TABLET ORAL at 08:54

## 2020-12-21 RX ADMIN — RISPERIDONE 1 MG: 1 TABLET ORAL at 08:54

## 2020-12-21 RX ADMIN — ENOXAPARIN SODIUM 40 MG: 40 INJECTION SUBCUTANEOUS at 15:22

## 2020-12-21 RX ADMIN — LISINOPRIL 40 MG: 20 TABLET ORAL at 08:54

## 2020-12-21 RX ADMIN — LEVOTHYROXINE SODIUM 75 MCG: 0.07 TABLET ORAL at 06:16

## 2020-12-21 RX ADMIN — Medication 10 ML: at 08:54

## 2020-12-21 NOTE — PLAN OF CARE
Goal Outcome Evaluation:  Plan of Care Reviewed With: patient  Progress: improving  Outcome Summary: Pt agreeable to tx although declined bathing/dressing tasks due to recently completed.  Pt agreed to UE exercise.  Pt completed AROM for strengthening for RUE shoulder/elbow and LUE elbow only due to ROM limitations L shd.  Pt completed 15 reps with breif rest between exercises.  Pt completed GH tasks seated in chair for cleaning hands/nails with SBA/guillen, and encouragement to clean visibly soiled hands/nails.  Pt continues to function below baseline therefore OT recommending inpt rehab. Skilled OT to continue to see 5x/wk while at [facility]. PPE worn gloves, mask, goggles.

## 2020-12-21 NOTE — PROGRESS NOTES
Continued Stay Note  [facility]     Patient Name: Norma De Los Santos  MRN: [id number]  Today's Date: 12/21/2020    Admit Date: 12/18/2020    Discharge Plan     Row Name 12/21/20 1502       Plan    Plan  [facility] at d/c. Pre-cert waived per COVID-19 emergency preparedness waiver. PASRR approved per CMS 1135 waiver.    Plan Comments  Per RN report via secure chat, RN attempted to call report to [facility] and admissions representative (Dedrick) informed RN that [facility] would not accept pt. SW asked liaison (Ne) if this is accurate. Per liaison at 12pm on 12/21, [facility] would attempt to move beds to determine if an isolation bed could be obtained. At 3pm, liaison stated [facility] would not have a bed until Wednesday and that beds are available at [facility] in [place] and [facility] in [place]. SW called pt to present these options. Pt reports she would like the referral to [facility]. SW informed liaison via text and liaison stated she is sending the referral. SW inquired that bed is definitely available at [facility] and liaison stated that a bed is available. Per liaison, RN can call report to [facility] after 3:30pm. SW notified RN via secure chat.        Phone communication only - no physical contact with patient or family.    [facility]le[facility] [place]    Office: [phone]  Cell: [phone]  Fax: [phone]  E-mail: [email or web address]

## 2020-12-21 NOTE — THERAPY TREATMENT NOTE
Patient Name: Norma De Los Santos  : 1955    MRN: [id number]                              Today's Date: 2020       Admit Date: 2020    Visit Dx: No diagnosis found.  Patient Active Problem List   Diagnosis   (none) - all problems resolved or deleted     Past Medical History:   Diagnosis Date   • COPD (chronic obstructive pulmonary disease) (CMS/HCC)    • Disease of thyroid gland    • Elevated cholesterol    • GERD (gastroesophageal reflux disease)    • Hypertension      Past Surgical History:   Procedure Laterality Date   • BREAST LUMPECTOMY     • BREAST SURGERY     • DILATATION AND CURETTAGE     • FRACTURE SURGERY     • ORIF ELBOW FRACTURE     • WISDOM TOOTH EXTRACTION       General Information     Row Name 20 1459          OT Time and Intention    Document Type  therapy note (daily note)  -     Mode of Treatment  occupational therapy  -     Row Name 20 1459          General Information    Patient Profile Reviewed  yes  -DR     Existing Precautions/Restrictions  fall  -       User Key  (r) = Recorded By, (t) = Taken By, (c) = Cosigned By    Initials Name Provider Type    Kristina Peng, OT Occupational Therapist          Mobility/ADL's     Row Name 20 1459          Bed Mobility    Comment (Bed Mobility)  Pt upto chair  -     Row Name 20 1459          Activities of Daily Living    BADL Assessment/Intervention  grooming  -     Row Name 20 1459          Grooming Assessment/Training    [place] Level (Grooming)  grooming skills;wash face, hands;set up;standby assist;verbal cues OT assisted with hand washing (hand visibly soiled) including cleaning under nails using toothbrush. Toothbrush discarded.  OT encouraged pt to trim nails when able to avoid growth of bacteria.  -DR     Position (Grooming)  supported sitting  -       User Key  (r) = Recorded By, (t) = Taken By, (c) = Cosigned By    Initials Name Provider Type    Kristina Peng, OT  Occupational Therapist        Obj/Interventions     Row Name 12/21/20 1501          Shoulder (Therapeutic Exercise)    Shoulder (Therapeutic Exercise)  AROM (active range of motion)  -     Shoulder AROM (Therapeutic Exercise)  right;extension;flexion;aBduction;aDduction x 15 reps  -DR     Row Name 12/21/20 1501          Elbow/Forearm (Therapeutic Exercise)    Elbow/Forearm (Therapeutic Exercise)  AROM (active range of motion)  -     Elbow/Forearm AROM (Therapeutic Exercise)  bilateral;flexion;extension;supination;pronation x 15 reps  -DR     Row Name 12/21/20 1501          Balance    Static Sitting Balance  WFL;sitting in chair  -DR     Dynamic Sitting Balance  WFL;sitting in chair  -DR     Row Name 12/21/20 1501          Therapeutic Exercise    Therapeutic Exercise  shoulder;elbow/forearm  -       User Key  (r) = Recorded By, (t) = Taken By, (c) = Cosigned By    Initials Name Provider Type    Kristina Peng, OT Occupational Therapist        Goals/Plan    No documentation.       Clinical Impression     Row Name 12/21/20 1502          Pain Assessment    Additional Documentation  Pain Scale: Numbers Pre/Post-Treatment (Group)  -     Row Name 12/21/20 1502          Pain Scale: Numbers Pre/Post-Treatment    Pretreatment Pain Rating  0/10 - no pain  -DR     Posttreatment Pain Rating  0/10 - no pain  -DR     Row Name 12/21/20 1502          Plan of Care Review    Plan of Care Reviewed With  patient  -DR     Outcome Summary  Pt agreeable to tx although declined bathing/dressing tasks due to recently completed.  Pt agreed to UE exercise.  Pt completed AROM for strengthening for RUE shoulder/elbow and LUE elbow only due to ROM limitations L shd.  Pt completed 15 reps with breif rest between exercises.  Pt completed GH tasks seated in chair for cleaning hands/nails with SBA/guileln, and encouragement to clean visibly soiled hands/nails.  Pt continues to function below baseline therefore OT recommending inpt rehab.  Skilled OT to continue to see 5x/wk while at [facility]. PPE worn gloves, mask, goggles.  -     Row Name 12/21/20 1502          Therapy Plan Review/Discharge Plan (OT)    Anticipated Discharge Disposition (OT)  inpatient rehabilitation facility  -     Row Name 12/21/20 1502          Positioning and Restraints    Pre-Treatment Position  sitting in chair/recliner  -     Post Treatment Position  chair  -DR     In Chair  sitting;call light within reach;encouraged to call for assist;exit alarm on  -       User Key  (r) = Recorded By, (t) = Taken By, (c) = Cosigned By    Initials Name Provider Type    Kristina Peng, OT Occupational Therapist        Outcome Measures     Row Name 12/21/20 1506          Functional Assessment    Outcome Measure Options  AM-PAC 6 Clicks Daily Activity (OT)  -       User Key  (r) = Recorded By, (t) = Taken By, (c) = Cosigned By    Initials Name Provider Type    Kristina Peng, OT Occupational Therapist        Occupational Therapy Education                 Title: PT OT SLP Therapies (In Progress)     Topic: Occupational Therapy (In Progress)     Point: ADL training (Done)     Description:   Instruct learner(s) on proper safety adaptation and remediation techniques during self care or transfers.   Instruct in proper use of assistive devices.              Learning Progress Summary           Patient Acceptance, E,TB, VU,NR by  at 12/21/2020 1507    Acceptance, E, VU by JANETTE at 12/21/2020 0926    Acceptance, E,TB, VU,NR by YURI at 12/20/2020 2324    Acceptance, E,TB, VU,NR by JAQUELINE at 12/20/2020 1806    Acceptance, E,TB, NR,VU by YURI at 12/19/2020 2207    Acceptance, E, VU,DU,NR by NANCY at 12/19/2020 1131                   Point: Home exercise program (Done)     Description:   Instruct learner(s) on appropriate technique for monitoring, assisting and/or progressing therapeutic exercises/activities.              Learning Progress Summary           Patient Acceptance, E,TB, VU,NR by  at  12/21/2020 1507                   Point: Precautions (Not Started)     Description:   Instruct learner(s) on prescribed precautions during self-care and functional transfers.              Learner Progress:  Not documented in this visit.          Point: Body mechanics (Not Started)     Description:   Instruct learner(s) on proper positioning and spine alignment during self-care, functional mobility activities and/or exercises.              Learner Progress:  Not documented in this visit.                      User Key     Initials Effective Dates Name Provider Type Discipline     03/01/19 -  Isa Slade PTA Physical Therapy Assistant PT     03/01/19 -  Sultana Santos, RN Registered Nurse Nurse    MW 07/10/20 -  Ye Berriso, RN Registered Nurse Nurse    NANCY 07/15/20 -  Flora Perkins OT Occupational Therapist OT     08/24/20 -  Kristina Schneider OT Occupational Therapist OT              OT Recommendation and Plan     Plan of Care Review  Plan of Care Reviewed With: patient  Outcome Summary: Pt agreeable to tx although declined bathing/dressing tasks due to recently completed.  Pt agreed to UE exercise.  Pt completed AROM for strengthening for RUE shoulder/elbow and LUE elbow only due to ROM limitations L shd.  Pt completed 15 reps with breif rest between exercises.  Pt completed GH tasks seated in chair for cleaning hands/nails with SBA/guillen, and encouragement to clean visibly soiled hands/nails.  Pt continues to function below baseline therefore OT recommending inpt rehab. Skilled OT to continue to see 5x/wk while at [facility]. PPE worn gloves, mask, goggles.     Time Calculation:   Time Calculation- OT     Row Name 12/21/20 1507             Time Calculation- OT    OT Start Time  1428  -DR      OT Stop Time  1451  -DR      OT Time Calculation (min)  23 min  -DR      OT Received On  12/21/20  -      OT - Next Appointment  12/22/20  -        User Key  (r) = Recorded By, (t) = Taken By, (c) = Cosigned  By    Initials Name Provider Type    Kristina Peng, JAIDA Occupational Therapist        Therapy Charges for Today     Code Description Service Date Service Provider Modifiers Qty    57857859914 HC OT SELF CARE/MGMT/TRAIN EA 15 MIN 12/21/2020 Kristina Schneider OT GO 1    01540569274 HC OT THER PROC EA 15 MIN 12/21/2020 Kristina Schneider OT GO 1               Kristina Schneider OT  12/21/2020

## 2020-12-21 NOTE — PLAN OF CARE
Goal Outcome Evaluation:  Pt A&Ox3, v/s stable, stable on room air, delayed response, son at bedside, call light in reach, RN will cont to monitor.

## 2020-12-21 NOTE — PROGRESS NOTES
Discharge Planning Assessment  [facility]     Patient Name: Norma De Los Santos  MRN: [id number]  Today's Date: 12/21/2020    Admit Date: 12/18/2020    Discharge Needs Assessment     Row Name 12/21/20 1602       Living Environment    Lives With  child(kiah), adult    Name(s) of Who Lives With Patient  Lives with son Arnav    Current Living Arrangements  home/apartment/condo    Able to Return to Prior Arrangements  no PT recommends IP Rehab       Resource/Environmental Concerns    Resource/Environmental Concerns  none    Transportation Concerns  car, none       Transition Planning    Patient/Family Anticipates Transition to  inpatient rehabilitation facility    Patient/Family Anticipated Services at Transition  rehabilitation services    Transportation Anticipated  family or friend will provide       Discharge Needs Assessment    Equipment Currently Used at Home  walker, standard;cane, quad    Concerns to be Addressed  discharge planning        Discharge Plan     Row Name 12/21/20 1602       Plan    Plan Comments  Spoke to patient in room with mask and goggles maintaining 6 ft for less than 15 min. States lives at home with her son. Confirmed PCP and Pharmacy. Plan to DC to rehab today .    Row Name 12/21/20 1504       Plan    Plan  [facility] at d/c. Pre-cert waived per COVID-19 emergency preparedness waiver. PASRR approved per CMS 1135 waiver.    Plan Comments  Per RN report via secure chat, RN attempted to call report to [facility] and admissions representative (Dedrick) informed RN that [facility] would not accept pt. SW asked liaison (Ne) if this is accurate. Per liaison at 12pm on 12/21, [facility] would attempt to move beds to determine if an isolation bed could be obtained. At 3pm, liaison stated [facility] would not have a bed until Wednesday and that beds are available at [facility] in [place] and [facility] in [place]. SW called pt to present these options. Pt reports she would like the referral to [facility]. SW informed liaison via text and liaison stated she is sending the referral. SW inquired that bed is definitely available at [facility] and liaison stated that a bed is available. Per liaison, RN can call report to [facility] after 3:30pm. CRISTIAN notified RN via secure chat.        Continued Care and Services - Admitted Since 12/18/2020     Destination     Service Provider Request Status Selected Services Address Phone Fax Patient Preferred    [facility] [place]  Pending - Request Sent N/A [address] [place] [address] [phone] --    [facility]  No Bed Available N/A [address] [place] [address] [phone] --              Expected Discharge Date and Time     Expected Discharge Date Expected Discharge Time    Dec 21, 2020         Demographic Summary     Row Name 12/21/20 1602       General Information    Admission Type  inpatient    Referral Source  admission list    Reason for Consult  discharge planning    Preferred Language  English        Functional Status     Row Name 12/21/20 1602       Functional Status, IADL    Medications  assistive person    Meal Preparation  assistive person    Housekeeping  assistive person    Laundry  assistive person    Shopping  assistive person          Michelle Simpson RN, CM  Office Phone [phone]  Cell [phone]

## 2020-12-21 NOTE — DISCHARGE SUMMARY
[facility] Medicine Services  DISCHARGE SUMMARY        Prepared For PCP:  Chavo Shields    Patient Name: Norma De Los Santos  : 1955  MRN: [id number]      Date of Admission:   2020    Date of Discharge:  2020    Length of stay:  LOS: 3 days     Hospital Course     Presenting Problem:   Hyponatremia [E87.1]      Active Hospital Problems   No active problems to display.      Resolved Hospital Problems    Diagnosis Date Resolved POA    Toxic metabolic encephalopathy [G92] 2020 Yes    Weakness [R53.1] 2020 Yes    Hyponatremia [E87.1] 2020 Yes     Generalized weakness with multiple falls prior to admission with AMS may be due to due hypertensive encephalopathy versus acute toxic metabolic encephalopathy due to hypovolemic hyponatremia  -improved with IV fluid hydration  -The patient's mental status apparently improved quickly and she was alert and oriented x3 according to the nurses notes by the time she was transferred to this facility  -Urinalysis appeared to be suggestive of urinary tract infection but urine culture was no growth     Hyponatremia (maybe chronic)  -improved with gentle IV fluid hydration     Longstanding history of alcohol abuse  -Patient reports that she has been abstaining for the past 9 months     Accelerated hypertension on top of essential hypertension, chronic and uncontrolled  -improved with increased lisinopril dose and addition of Norvasc     Bipolar disorder  -Continue celexa and risperdal     Hypothyroidism  - tsh 2.5  -Continue levothyroxine    Mixed hyperlipidemia  -Continue Lipitor     Mechanical fall at home (with multiple bruises present on admission) and generalize weakness  -tsh, b12,and folate within normal limits  -cont PT as tolerated         Hospital Course:  Norma De Los Santos is a 65 y.o. female with history of alcohol abuse (last alcohol use was 9 months ago), bipolar disorder, essential hypertension, hyperlipidemia,  multiple falls in the past few months and hypothyroidism.  She was admitted in transfer from [facility], where she presented with acute mental status changes/encephalopathy.  Laboratory at [facility] showed hyponatremia, with sodium of 122, toxicology screen unremarkable and alcohol level within normal limits.  She reportedly tested negative for Covid-19.  Chest x-ray showed no acute cardiopulmonary process.  Urine analysis suggestive for urinary tract infection, but denies dysuria or flank pain.     On arrival to [facility], she was hypertensive with blood pressure of 186/172 mmHg.   She denies chest pain, no shortness of breath, fever, chills, no dysuria, abdominal pain, nausea,  vomiting or diarrhea.  She endorses generalized weakness with unsteady gait and poor appetite and oral intake.  Laboratory @ [facility]  is remarkable for hyponatremia, with sodium of 121.    The patient received an increased dosage of lisinopril and subsequently Norvasc was added with improved blood pressure control.  The patient's hyponatremia improved with gentle IV fluid hydration.  The patient's urine culture was no growth.  The patient's mentation improved.  She is now felt to be stable for discharge to the rehab facility.           Day of Discharge     HPI: No current complaints.    Vital Signs:   Temp:  [97.1 °F (36.2 °C)-97.9 °F (36.6 °C)] 97.5 °F (36.4 °C)  Heart Rate:  [73-92] 91  Resp:  [12-25] 12  BP: (108-175)/(59-90) 108/59     Physical Exam:  Physical Exam  Well-developed thin female not ill-appearing in no acute distress sitting up in bed awake and alert; mucous membranes moist; sclerae anicteric; lungs clear to auscultation bilaterally; CV regular rate and rhythm; abdomen soft nontender nondistended with active bowel sounds; extremities with no edema, cyanosis or calf tenderness; palpable pedal pulses bilaterally; no Villegas catheter.    Pertinent  and/or Most Recent Results     Results from last 7 days   Lab Units 12/20/20  8876  12/19/20  0421 12/18/20  1524   WBC 10*3/mm3  --  8.00 8.70   HEMOGLOBIN g/dL  --  12.1 12.7   HEMATOCRIT %  --  34.7 36.9   PLATELETS 10*3/mm3  --  236 276   SODIUM mmol/L 130* 124* 121*   POTASSIUM mmol/L 3.9 3.6 3.9   CHLORIDE mmol/L 99 92* 91*   CO2 mmol/L 26.0 24.0 23.0   BUN mg/dL 6* 7* 7*   CREATININE mg/dL 0.52* 0.47* 0.53*   GLUCOSE mg/dL 98 101* 96   CALCIUM mg/dL 8.0* 8.0* 8.4*     Results from last 7 days   Lab Units 12/18/20  1524   BILIRUBIN mg/dL 0.9   ALK PHOS U/L 90   ALT (SGPT) U/L 15   AST (SGOT) U/L 28     Results from last 7 days   Lab Units 12/18/20  1524   CHOLESTEROL mg/dL 125   TRIGLYCERIDES mg/dL 60   HDL CHOL mg/dL 55     Results from last 7 days   Lab Units 12/18/20  1524   TSH uIU/mL 2.510   TROPONIN T ng/mL <0.010       Brief Urine Lab Results  (Last result in the past 365 days)        Color   Clarity   Blood   Leuk Est   Nitrite   Protein   CREAT   Urine HCG        12/19/20 0211 Yellow Clear Negative Small (1+) Positive 30 mg/dL (1+)                 Microbiology Results Abnormal       Procedure Component Value - Date/Time    Urine Culture - Urine, Urine, Clean Catch [[id number]]  (Normal) Collected: 12/19/20 0211    Lab Status: Final result Specimen: Urine, Clean Catch Updated: 12/20/20 1046     Urine Culture No growth            Xr Chest 1 View    Result Date: 12/18/2020  Impression: No acute chest finding.  Electronically Signed By-Libby Mclcure MD On:12/18/2020 3:50 PM This report was finalized on [id number] by  Libby Mcclure MD.                             Test Results Pending at Discharge         Procedures Performed           Consults:   Consults       No orders found from 11/19/2020 to 12/19/2020.              Discharge Details        Discharge Medications        New Medications        Instructions Start Date   amLODIPine 10 MG tablet  Commonly known as: NORVASC   10 mg, Oral, Every 24 Hours Scheduled   Start Date: December 22, 2020     docusate sodium 100 MG capsule   100  mg, Oral, 2 Times Daily      magnesium oxide 400 (241.3 Mg) MG tablet tablet  Commonly known as: MAGOX   400 mg, Oral, Daily   Start Date: December 22, 2020            Changes to Medications        Instructions Start Date   lisinopril 20 MG tablet  Commonly known as: PRINIVILZESTRIL  What changed: how much to take   40 mg, Oral, Daily             Continue These Medications        Instructions Start Date   atorvastatin 40 MG tablet  Commonly known as: LIPITOR   40 mg, Oral, Nightly      citalopram 20 MG tablet  Commonly known as: CeleXA   20 mg, Oral, Daily      levothyroxine 75 MCG tablet  Commonly known as: SYNTHROID, LEVOTHROID   75 mcg, Oral, Daily      risperiDONE 1 MG tablet  Commonly known as: risperDAL   1 mg, Oral, Daily      risperiDONE 2 MG tablet  Commonly known as: risperDAL   2 mg, Oral, Nightly               Allergies   Allergen Reactions    Amoxicillin Hives         Discharge Disposition:  Rehab Facility or Unit (DC - External)    Diet:  Hospital:  Diet Order   Procedures    Diet Regular         Discharge Activity:   Activity Instructions       Activity as Tolerated                CODE STATUS:    Code Status and Medical Interventions:   Ordered at: 12/18/20 9205     Level Of Support Discussed With:    Patient     Code Status:    CPR     Medical Interventions (Level of Support Prior to Arrest):    Full         Follow-up Appointments  No future appointments.    Additional Instructions for the Follow-ups that You Need to Schedule       Call MD With Problems / Concerns   As directed      Instructions: Call [phone] or email [email or web address] for problems or concerns.    Order Comments: Instructions: Call [phone] or email [email or web address] for problems or concerns.                    Condition on Discharge:      Stable      This patient has been examined wearing appropriate Personal Protective Equipment . 12/21/20      Electronically signed by Bhumi Dalal MD, 12/21/20, 11:00  AM EST.      Time: I spent 25 minutes on this discharge activity which included face-to-face encounter with the patient/reviewing the data in the system/coordination of the care with the nursing staff as well as consultants/documentation/entering orders.

## 2020-12-21 NOTE — PLAN OF CARE
Goal Outcome Evaluation:  Plan of Care Reviewed With: patient  Progress: improving       Pt is 66 y/o F admitted for hyponatremia. Pt is slowly improving. Plan is to d/c to [facility]. Pt on 2-3 L O2 NC. Pt is A/O, calm, cooperative. Pt's O2 stats drop at times. Pt snores when sleeping. Vital signs have been stable. Will cont to monitor throughout the night.    Problem: Adult Inpatient Plan of Care  Goal: Plan of Care Review  Outcome: Ongoing, Progressing  Goal: Patient-Specific Goal (Individualized)  Outcome: Ongoing, Progressing  Goal: Absence of Hospital-Acquired Illness or Injury  Outcome: Ongoing, Progressing  Intervention: Identify and Manage Fall Risk  Recent Flowsheet Documentation  Taken 12/21/2020 0400 by Ye Berrios RN  Safety Promotion/Fall Prevention:   safety round/check completed   room organization consistent   nonskid shoes/slippers when out of bed   fall prevention program maintained   clutter free environment maintained   assistive device/personal items within reach  Taken 12/21/2020 0200 by Ye Berrios, RN  Safety Promotion/Fall Prevention:   safety round/check completed   room organization consistent   nonskid shoes/slippers when out of bed   fall prevention program maintained   clutter free environment maintained   assistive device/personal items within reach  Taken 12/21/2020 0020 by Ye Berrios RN  Safety Promotion/Fall Prevention:   safety round/check completed   room organization consistent   assistive device/personal items within reach   clutter free environment maintained   activity supervised   lighting adjusted   fall prevention program maintained   nonskid shoes/slippers when out of bed  Taken 12/20/2020 2200 by Ye Berrios RN  Safety Promotion/Fall Prevention:   safety round/check completed   room organization consistent   nonskid shoes/slippers when out of bed   fall prevention program maintained   assistive device/personal items within reach    clutter free environment maintained  Taken 12/20/2020 2015 by Ye Berrios RN  Safety Promotion/Fall Prevention:   safety round/check completed   room organization consistent   nonskid shoes/slippers when out of bed   lighting adjusted   fall prevention program maintained   clutter free environment maintained   assistive device/personal items within reach   activity supervised  Intervention: Prevent and Manage VTE (venous thromboembolism) Risk  Recent Flowsheet Documentation  Taken 12/21/2020 0020 by Ye Berrios RN  VTE Prevention/Management:   bilateral   sequential compression devices off   patient refused intervention  Taken 12/20/2020 2015 by Ye Berrios RN  VTE Prevention/Management:   bilateral   sequential compression devices off   patient refused intervention  Intervention: Prevent Infection  Recent Flowsheet Documentation  Taken 12/21/2020 0400 by Ye Berrios RN  Infection Prevention:   hand hygiene promoted   rest/sleep promoted  Taken 12/21/2020 0200 by Ye Berrios RN  Infection Prevention:   hand hygiene promoted   rest/sleep promoted  Taken 12/21/2020 0020 by Ye Berrios RN  Infection Prevention:   hand hygiene promoted   rest/sleep promoted  Taken 12/20/2020 2200 by Ye Berrios RN  Infection Prevention:   hand hygiene promoted   personal protective equipment utilized   rest/sleep promoted  Taken 12/20/2020 2015 by Ye Berrios RN  Infection Prevention:   hand hygiene promoted   rest/sleep promoted   personal protective equipment utilized  Goal: Optimal Comfort and Wellbeing  Outcome: Ongoing, Progressing  Intervention: Provide Person-Centered Care  Recent Flowsheet Documentation  Taken 12/20/2020 2015 by Ye Berrios RN  Trust Relationship/Rapport:   care explained   choices provided   emotional support provided   empathic listening provided   questions answered   questions encouraged   reassurance provided   thoughts/feelings acknowledged  Goal:  Readiness for Transition of Care  Outcome: Ongoing, Progressing     Problem: Fall Injury Risk  Goal: Absence of Fall and Fall-Related Injury  Outcome: Ongoing, Progressing  Intervention: Identify and Manage Contributors to Fall Injury Risk  Recent Flowsheet Documentation  Taken 12/21/2020 0020 by Ye Berrios RN  Medication Review/Management: medications reviewed  Taken 12/20/2020 2015 by Ye Berrios RN  Medication Review/Management: medications reviewed  Intervention: Promote Injury-Free Environment  Recent Flowsheet Documentation  Taken 12/21/2020 0400 by Ye Berrios RN  Safety Promotion/Fall Prevention:   safety round/check completed   room organization consistent   nonskid shoes/slippers when out of bed   fall prevention program maintained   clutter free environment maintained   assistive device/personal items within reach  Taken 12/21/2020 0200 by Ye Berrios RN  Safety Promotion/Fall Prevention:   safety round/check completed   room organization consistent   nonskid shoes/slippers when out of bed   fall prevention program maintained   clutter free environment maintained   assistive device/personal items within reach  Taken 12/21/2020 0020 by Ye Berrios RN  Safety Promotion/Fall Prevention:   safety round/check completed   room organization consistent   assistive device/personal items within reach   clutter free environment maintained   activity supervised   lighting adjusted   fall prevention program maintained   nonskid shoes/slippers when out of bed  Taken 12/20/2020 2200 by Ye Berrios RN  Safety Promotion/Fall Prevention:   safety round/check completed   room organization consistent   nonskid shoes/slippers when out of bed   fall prevention program maintained   assistive device/personal items within reach   clutter free environment maintained  Taken 12/20/2020 2015 by Ye Berrios RN  Safety Promotion/Fall Prevention:   safety round/check completed   room  organization consistent   nonskid shoes/slippers when out of bed   lighting adjusted   fall prevention program maintained   clutter free environment maintained   assistive device/personal items within reach   activity supervised     Problem: Skin Injury Risk Increased  Goal: Skin Health and Integrity  Outcome: Ongoing, Progressing  Intervention: Optimize Skin Protection  Recent Flowsheet Documentation  Taken 12/21/2020 0400 by Ye Berrios RN  Pressure Reduction Techniques:   frequent weight shift encouraged   weight shift assistance provided  Pressure Reduction Devices:   positioning supports utilized   pressure-redistributing mattress utilized  Skin Protection:   incontinence pads utilized   pectin skin barriers applied   skin-to-device areas padded   tubing/devices free from skin contact  Taken 12/21/2020 0020 by Ye Berrios RN  Pressure Reduction Techniques:   frequent weight shift encouraged   weight shift assistance provided  Pressure Reduction Devices:   positioning supports utilized   pressure-redistributing mattress utilized  Skin Protection:   incontinence pads utilized   pectin skin barriers applied   skin-to-device areas padded   tubing/devices free from skin contact  Taken 12/20/2020 2015 by Ye Berrios RN  Pressure Reduction Techniques:   frequent weight shift encouraged   weight shift assistance provided  Pressure Reduction Devices:   positioning supports utilized   pressure-redistributing mattress utilized  Skin Protection:   incontinence pads utilized   pectin skin barriers applied   skin-to-device areas padded   tubing/devices free from skin contact  Intervention: Promote and Optimize Oral Intake  Recent Flowsheet Documentation  Taken 12/21/2020 0400 by Ye Berrios RN  Oral Nutrition Promotion: rest periods promoted     Problem: Electrolyte Imbalance  Goal: Electrolyte Balance  Outcome: Ongoing, Progressing

## 2020-12-21 NOTE — PAYOR COMM NOTE
"This is clinical for Norma De Los Santos:    AUTHORIZATION PENDING:   PLEASE CALL OR FAX DETERMINATION TO CONTACT BELOW. THANK YOU.     ANTONIETA RODRIGUEZ RN  UTILIZATION REVIEW  [facility]  PH: [phone]  FX: [phone]    Dehydration RRG  RRG: [id number] ([facility])  Link to Codes  Admission is indicated for 1 or more of the following:  Altered mental status (ie, different from baseline) that is severe or persistent as indicated by 1 or more of the following(1)(2)(3)(4):  Confusional state (eg, disorientation, difficulty following commands, deficit in attention) that persists (eg, for more than few hours) despite appropriate treatment (eg, of underlying cause)     Severe electrolyte abnormalities requiring inpatient care as indicated by ALL of the following(1)(2)(3):  Electrolyte abnormality is not at acceptable patient baseline (eg, treatment effect)  Severe abnormality as indicated by 1 or more of the following:    Sodium[A] less than 130 mEq/L (mmol/L) not corrected to near normal or near chronic baseline despite observation care treatment    Sodium[A] less than 135 mEq/L (mmol/L) with severe finding requiring inpatient management (eg, Altered mental status, seizures)    Norma De Los Santos (65 y.o. Female)     Date of Birth Social Security Number Address Home Phone MRN    1955  [address] [facility] [address] [phone] [id number]    Scientologist Marital Status          Non-Zoroastrianism        Admission Date Admission Type Admitting Provider Attending Provider Department, Room/Bed    12/18/20 Urgent Bhumi Dalal MD Hall, Kelli G, MD [facility] NEURO HEART, 267/1    Discharge Date Discharge Disposition Discharge Destination         Rehab Facility or Unit (DC - External)              Attending Provider: Bhumi Daall MD    Allergies: Amoxicillin    Isolation: None   Infection: None   Code Status: CPR    Ht: 180.3 cm (70.98\")   Wt: 67.3 kg (148 lb 5.9 oz)    Admission Cmt: None  "   Principal Problem: None                Active Insurance as of 2020     Primary Coverage     Payor Plan Insurance Group Employer/Plan Group    [employer]UE [employer] BLUE [employer]IELD PPO [id number]     Payor Plan Address Payor Plan Phone Number Payor Plan Fax Number Effective Dates    [address] [phone]  2020 - None Entered    [facility] [id number]       Subscriber Name Subscriber Birth Date Member ID       KEVIN DE LOS SANTOS  [id number]                 Emergency Contacts      (Rel.) Home Phone Work Phone Mobile Phone    Kevin De Los Santos (Spouse) [phone] -- [phone]    Wally De Los Santos (Son) -- -- [phone]               History & Physical      Vern-Lenard Riggs MD at 20 1613                [facility] Medicine Services      Patient Name: Norma De Los Santos  : 1955  MRN: [id number]  Primary Care Physician: Chavo Shields  Date of admission: 2020    Patient Care Team:  Chavo Shields as PCP - General (Family Medicine)          Subjective   History Present Illness     Chief Complaint: No chief complaint on file.      Need to select a service from the Handoff activity.      History of Present Illness  65-year-old frail-appearing female with history of alcohol abuse (last alcohol use was 9 months ago), bipolar disorder, essential hypertension, hyperlipidemia, multiple falls in the past few months and hypothyroidism.  She was admitted in transfer from [facility] ([facility], where she presented with acute mental status changes/encephalopathy.  Laboratory at [facility] showed hyponatremia, with sodium of 122, toxicology screen unremarkable and alcohol level within normal limits.  She reportedly tested negative for Covid-19.  Chest x-ray showed no acute cardiopulmonary process.  Urine analysis suggestive for urinary tract infection, but denies dysuria or flank pain.     On arrival to [facility], she was hypertensive with blood pressure of 186/172 mmHg.   She denies chest  pain, no shortness of breath, fever, chills, no dysuria, abdominal pain, nausea,  vomiting or diarrhea.  She endorses generalized weakness with poor appetite and oral intake.  Laboratory @ [facility]  is remarkable for hyponatremia, with sodium of 121, otherwise CBC is unremarkable.       Review of Systems   Constitution: Positive for decreased appetite.   HENT: Negative.    Eyes: Negative.    Cardiovascular: Negative.    Respiratory: Negative.    Endocrine: Negative.    Skin: Negative.    Musculoskeletal: Positive for falls and muscle weakness.   Gastrointestinal: Negative.    Genitourinary: Negative.    Neurological: Positive for paresthesias and weakness.   Psychiatric/Behavioral: Positive for altered mental status and depression.   Allergic/Immunologic: Negative.    All other systems reviewed and are negative.             Personal History     Past Medical History:   Past Medical History:   Diagnosis Date   • COPD (chronic obstructive pulmonary disease) (CMS/HCC)    • Disease of thyroid gland    • Elevated cholesterol    • GERD (gastroesophageal reflux disease)    • Hypertension        Surgical History:      Past Surgical History:   Procedure Laterality Date   • BREAST LUMPECTOMY     • BREAST SURGERY     • DILATATION AND CURETTAGE     • FRACTURE SURGERY     • ORIF ELBOW FRACTURE     • WISDOM TOOTH EXTRACTION             Family History: family history is not on file. Otherwise pertinent FHx was reviewed and unremarkable.     Social History:  reports that she has been smoking. She has a 22.50 pack-year smoking history. She has never used smokeless tobacco. She reports that she does not use drugs.      Medications:  Prior to Admission medications    Medication Sig Start Date End Date Taking? Authorizing Provider   atorvastatin (LIPITOR) 40 MG tablet Take 40 mg by mouth Every Night.   Yes Tito Miranda MD   citalopram (CeleXA) 20 MG tablet Take 20 mg by mouth Daily.   Yes Tito Miranda MD   levothyroxine  (SYNTHROID, LEVOTHROID) 75 MCG tablet Take 75 mcg by mouth Daily.   Yes Provider, MD Tito   lisinopril (PRINIVIL,ZESTRIL) 20 MG tablet Take 20 mg by mouth Daily.   Yes Provider, MD Tito   risperiDONE (risperDAL) 1 MG tablet Take 1 mg by mouth Daily.   Yes Provider, MD Tito   risperiDONE (risperDAL) 2 MG tablet Take 2 mg by mouth Every Night.   Yes Provider, MD Tito       Allergies:    Allergies   Allergen Reactions   • Amoxicillin Hives       Objective   Objective     Vital Signs  Temp:  [98.1 °F (36.7 °C)-98.2 °F (36.8 °C)] 98.2 °F (36.8 °C)  Heart Rate:  [88-89] 88  Resp:  [23-29] 23  BP: (172-186)/() 172/91  SpO2:  [92 %-93 %] 92 %  on   ;   Device (Oxygen Therapy): room air  There is no height or weight on file to calculate BMI.    Physical Exam  Constitutional:       General: She is not in acute distress.     Appearance: Normal appearance. She is not ill-appearing.   HENT:      Head: Normocephalic and atraumatic.      Mouth/Throat:      Mouth: Mucous membranes are dry.   Eyes:      Extraocular Movements: Extraocular movements intact.      Pupils: Pupils are equal, round, and reactive to light.   Neck:      Musculoskeletal: Normal range of motion and neck supple. No neck rigidity or muscular tenderness.   Cardiovascular:      Rate and Rhythm: Normal rate and regular rhythm.      Pulses: Normal pulses.      Heart sounds: No murmur. No gallop.    Pulmonary:      Effort: Pulmonary effort is normal.      Breath sounds: Normal breath sounds.   Abdominal:      General: Abdomen is flat.   Musculoskeletal: Normal range of motion.   Skin:     General: Skin is warm.   Neurological:      Mental Status: She is alert. Mental status is at baseline.   Psychiatric:         Mood and Affect: Mood normal.         Behavior: Behavior normal.         Results Review:  I have personally reviewed most recent  and agree with findings, most notably:     Results from last 7 days   Lab Units 12/18/20  1942     WBC 10*3/mm3 8.70   HEMOGLOBIN g/dL 12.7   HEMATOCRIT % 36.9   PLATELETS 10*3/mm3 276     Results from last 7 days   Lab Units 12/18/20  1524   SODIUM mmol/L 121*   POTASSIUM mmol/L 3.9   CHLORIDE mmol/L 91*   CO2 mmol/L 23.0   BUN mg/dL 7*   CREATININE mg/dL 0.53*   GLUCOSE mg/dL 96   CALCIUM mg/dL 8.4*   ALT (SGPT) U/L 15   AST (SGOT) U/L 28     CrCl cannot be calculated (Unknown ideal weight.).  Brief Urine Lab Results     None          Microbiology Results (last 10 days)     ** No results found for the last 240 hours. **          ECG/EMG Results (most recent)     None        Xr Chest 1 View    Result Date: 12/18/2020  No acute chest finding.  Electronically Signed By-Libby Mcclure MD On:12/18/2020 3:50 PM This report was finalized on [id number] by  Libby Mcclure MD.        CrCl cannot be calculated (Unknown ideal weight.).    Assessment/Plan   Assessment/Plan       Active Hospital Problems    Diagnosis  POA   • Hyponatremia [E87.1]  Yes      Resolved Hospital Problems   No resolved problems to display.     AMS/acute toxic metabolic encephalopathy     -multifactorial, 2/2 hyponatremia, volume contraction, suspected UTI    Hyponatremia (maybe chronic )       -serum osmolality a.m., gentle duration with normal saline        -if no improvement, or worsen will consider nephrology consultation and fluid restrict    Longstanding history of alcohol abuse    Accelerated hypertension      -increase lisinopril and added Norvasc    Bipolar disorder      -celexa/risperdal    Hypothyroidism      -Levothyroxine    Mixed hyperlipidemia       -Lipitor    Mechanical fall at home (with multiple bruises present on admission)/weakness       -check B12, folate, and TSH in a.m.        -will consult PT/OT graft                  VTE Prophylaxis -   Mechanical Order History:     None      Pharmalogical Order History:      Ordered     Dose Route Frequency Stop    12/18/20 1514  enoxaparin (LOVENOX) syringe 40 mg      40 mg SC  Every 24 Hours --                CODE STATUS:    Code Status and Medical Interventions:   Ordered at: 12/18/20 1514     Level Of Support Discussed With:    Patient     Code Status:    CPR     Medical Interventions (Level of Support Prior to Arrest):    Full             Electronically signed by Lenard Renner MD, 12/18/20, 4:13 PM EST.  [facility] Hospitalist Team          Electronically signed by Lenard Renner MD at 12/19/20 1417       Operative/Procedure Notes (last 72 hours) (Notes from 12/18/20 1449 through 12/21/20 1449)    No notes of this type exist for this encounter.            Physician Progress Notes (last 72 hours) (Notes from 12/18/20 1449 through 12/21/20 1449)      Lenard Renner MD at 12/20/20 1332                [facility] Medicine Services Daily Progress Note      Hospitalist Team  LOS 2 days      Patient Care Team:  Chavo Shields as PCP - General (Family Medicine)    Patient Location: [facility]      Subjective   Subjective   Resting in bed in no distress or discomfort.  Chief Complaint / Subjective  No chief complaint on file.        Brief Synopsis of Hospital Course/HPI  65-year-old frail-appearing female with history of alcohol abuse (last alcohol use was 9 months ago), bipolar disorder, essential hypertension, hyperlipidemia, multiple falls in the past few months and hypothyroidism.  She was admitted in transfer from [facility] ([facility], where she presented with acute mental status changes/encephalopathy.  Laboratory at [facility] showed hyponatremia, with sodium of 122, toxicology screen unremarkable and alcohol level within normal limits.  She reportedly tested negative for Covid-19.  Chest x-ray showed no acute cardiopulmonary process.  Urine analysis suggestive for urinary tract infection, but denies dysuria or flank pain.     On arrival to [facility], she was hypertensive with blood pressure of 186/172 mmHg.   She denies chest pain, no shortness of breath, fever,  "chills, no dysuria, abdominal pain, nausea,  vomiting or diarrhea.  She endorses generalized weakness with unsteady gait and poor appetite and oral intake.  Laboratory @ [facility]  is remarkable for hyponatremia, with sodium of 121, otherwise CBC is unremarkable.          Objective   Objective    Seen and examined in follow evaluation.  Vital Signs  Temp:  [97.6 °F (36.4 °C)-98.6 °F (37 °C)] 98.1 °F (36.7 °C)  Heart Rate:  [80-87] 86  Resp:  [18-24] 18  BP: (120-154)/(62-88) 137/62  Oxygen Therapy  SpO2: 99 %  Pulse Oximetry Type: Continuous  Device (Oxygen Therapy): nasal cannula  Flow (L/min): 2  Flowsheet Rows      First Filed Value   Admission Height  180.3 cm (71\") Documented at 12/18/2020 1900   Admission Weight  67.1 kg (147 lb 14.9 oz) Documented at 12/18/2020 1900        Intake & Output (last 3 days)       12/17 0701 - 12/18 0700 12/18 0701 - 12/19 0700 12/19 0701 - 12/20 0700 12/20 0701 - 12/21 0700    P.O.  444 1640 258    I.V. (mL/kg)  1000 (14.9)      Total Intake(mL/kg)  1444 (21.5) 1640 (24.4) 258 (3.8)    Net  +1444 +1640 +258            Urine Unmeasured Occurrence  4 x 1 x     Stool Unmeasured Occurrence   1 x         Lines, Drains & Airways    Active LDAs     Name:   Placement date:   Placement time:   Site:   Days:    Peripheral IV (Ped/Tyler) 12/18/20 Right;Anterior Forearm   12/18/20    0330     2                  Physical Exam  Constitutional:       General: She is not in acute distress.     Appearance: Normal appearance. She is not ill-appearing.   HENT:      Head: Normocephalic and atraumatic.      Mouth/Throat:      Mouth: Mucous membranes are dry.   Eyes:      Extraocular Movements: Extraocular movements intact.      Pupils: Pupils are equal, round, and reactive to light.   Neck:      Musculoskeletal: Normal range of motion and neck supple. No neck rigidity or muscular tenderness.   Cardiovascular:      Rate and Rhythm: Normal rate and regular rhythm.      Pulses: Normal pulses.      Heart sounds: " No murmur. No gallop.    Pulmonary:      Effort: Pulmonary effort is normal.      Breath sounds: Normal breath sounds.   Abdominal:      General: Abdomen is flat.   Musculoskeletal: Normal range of motion.   Skin:     General: Skin is warm.   Neurological:      Mental Status: She is alert. Mental status is at baseline.   Psychiatric:         Mood and Affect: Mood normal.         Behavior: Behavior normal.         Procedures:    Results Review:     I reviewed the patient's new clinical results.      Lab Results (last 24 hours)     Procedure Component Value Units Date/Time    Urine Culture - Urine, Urine, Clean Catch [[id number]]  (Normal) Collected: 12/19/20 0211    Specimen: Urine, Clean Catch Updated: 12/20/20 1046     Urine Culture No growth    Basic Metabolic Panel [[id number]]  (Abnormal) Collected: 12/20/20 0350    Specimen: Blood Updated: 12/20/20 0436     Glucose 98 mg/dL      BUN 6 mg/dL      Creatinine 0.52 mg/dL      Sodium 130 mmol/L      Potassium 3.9 mmol/L      Comment: Slight hemolysis detected by analyzer. Results may be affected.        Chloride 99 mmol/L      CO2 26.0 mmol/L      Calcium 8.0 mg/dL      eGFR Non African Amer 118 mL/min/1.73      BUN/Creatinine Ratio 11.5     Anion Gap 5.0 mmol/L     Narrative:      GFR Normal >60  Chronic Kidney Disease <60  Kidney Failure <15          No results found for: HGBA1C            No results found for: LIPASE  Lab Results   Component Value Date    CHOL 125 12/18/2020    TRIG 60 12/18/2020    HDL 55 12/18/2020    LDL 57 12/18/2020       No results found for: INTRAOP, PREDX, FINALDX, COMDX    Microbiology Results (last 10 days)     Procedure Component Value - Date/Time    Urine Culture - Urine, Urine, Clean Catch [[id number]]  (Normal) Collected: 12/19/20 0211    Lab Status: Final result Specimen: Urine, Clean Catch Updated: 12/20/20 1046     Urine Culture No growth          ECG/EMG Results (most recent)     Procedure Component Value Units Date/Time    ECG 12  Lead [[id number]] Collected: 12/18/20 1641     Updated: 12/19/20 1759     QT Interval 376 ms     Narrative:      HEART RATE= 88  bpm  RR Interval= 680  ms  [place] Interval= 155  ms  P Horizontal Axis= -27  deg  P Front Axis= 41  deg  QRSD Interval= 105  ms  QT Interval= 376  ms  QRS Axis= -8  deg  T Wave Axis= 144  deg  - ABNORMAL ECG -  Sinus rhythm  Probable left atrial enlargement  LVH with secondary repolarization abnormality  Probable anterior infarct, age indeterminate  No previous ECG available for comparison  Electronically Signed By: Ander Obrien ([facility]) 19-Dec-2020 17:58:56  Date and Time of Study: 2020-12-18 16:41:08                    Xr Chest 1 View    Result Date: 12/18/2020  No acute chest finding.  Electronically Signed By-Libby Mcclure MD On:12/18/2020 3:50 PM This report was finalized on [id number] by  Libby Mcclure MD.          Xrays, labs reviewed personally by physician.    Medication Review:   I have reviewed the patient's current medication list      Scheduled Meds  amLODIPine, 10 mg, Oral, Q24H  atorvastatin, 40 mg, Oral, Nightly  citalopram, 20 mg, Oral, Daily  docusate sodium, 100 mg, Oral, BID  enoxaparin, 40 mg, Subcutaneous, Q24H  levothyroxine, 75 mcg, Oral, Q AM  lisinopril, 40 mg, Oral, Daily  magnesium oxide, 400 mg, Oral, Daily  risperiDONE, 1 mg, Oral, Daily  risperiDONE, 2 mg, Oral, Nightly  sodium chloride, 10 mL, Intravenous, Q12H        Meds Infusions  sodium chloride, 50 mL/hr, Last Rate: 50 mL/hr (12/20/20 0627)        Meds PRN  •  acetaminophen  •  ondansetron  •  sodium chloride        Assessment/Plan   Assessment/Plan     Active Hospital Problems    Diagnosis  POA   • Weakness [R53.1]  Unknown   • Hyponatremia [E87.1]  Yes      Resolved Hospital Problems   No resolved problems to display.       MEDICAL DECISION MAKING COMPLEXITY BY PROBLEM:     AMS/acute toxic metabolic encephalopathy     -improved mentation, multifactorial--> 2/2 hyponatremia, suspected  UTI     Hyponatremia (maybe chronic )       -improved, gentle  hydration with normal saline and monitor closely        -tsh 2.5     Longstanding history of alcohol abuse     Accelerated hypertension      -increase lisinopril and added Norvasc     Bipolar disorder      -celexa/risperdal     Hypothyroidism      -Levothyroxine, tsh 2.5     Mixed hyperlipidemia       -Lipitor     Mechanical fall at home (with multiple bruises present on admission)/weakness       -tsh, b12,and folate within normal limits        -cont PT as tolerated    Disposition: Possible to SNF in 2 days      VTE Prophylaxis -   Mechanical Order History:     None      Pharmalogical Order History:      Ordered     Dose Route Frequency Stop    12/18/20 1514  enoxaparin (LOVENOX) syringe 40 mg      40 mg SC Every 24 Hours Scheduled --                  Code Status -   Code Status and Medical Interventions:   Ordered at: 12/18/20 1514     Level Of Support Discussed With:    Patient     Code Status:    CPR     Medical Interventions (Level of Support Prior to Arrest):    Full         Discharge Planning    Electronically signed by Lenard Renner MD, 12/20/20, 13:32 EST.  [facility] Hospitalist Team        Electronically signed by Lenard Renner MD at 12/20/20 1338     Lenard Renner MD at 12/19/20 1411                [facility] Medicine Services Daily Progress Note      Hospitalist Team  LOS 1 days      Patient Care Team:  Chavo Shields as PCP - General (Family Medicine)    Patient Location: 267/1      Subjective   Subjective   I feel a little bit better this morning.  Chief Complaint / Subjective  No chief complaint on file.        Brief Synopsis of Hospital Course/HPI  65-year-old frail-appearing female with history of alcohol abuse (last alcohol use was 9 months ago), bipolar disorder, essential hypertension, hyperlipidemia, multiple falls in the past few months and hypothyroidism.  She was admitted in transfer  "from [facility] ([place]), where she presented with acute mental status changes/encephalopathy.  Laboratory at [facility] showed hyponatremia, with sodium of 122, toxicology screen unremarkable and alcohol level within normal limits.  She reportedly tested negative for Covid-19.  Chest x-ray showed no acute cardiopulmonary process.  Urine analysis suggestive for urinary tract infection, but denies dysuria or flank pain.    On arrival to [facility], she was hypertensive with blood pressure of 186/172 mmHg.   She denies chest pain, no shortness of breath, fever, chills, no dysuria, abdominal pain, nausea,  vomiting or diarrhea.  She endorses generalized weakness with poor appetite and oral intake.  Laboratory @ [facility]  is remarkable for hyponatremia, with sodium of 121, otherwise CBC is unremarkable.       Objective   Objective    Seen and examined in follow-up.  Sitting comfortably in chair in no distress or discomfort.  No events reported overnight.  Vital Signs  Temp:  [97.4 °F (36.3 °C)-99 °F (37.2 °C)] 97.4 °F (36.3 °C)  Heart Rate:  [] 83  Resp:  [22-28] 28  BP: (137-172)/(73-91) 137/73  Oxygen Therapy  SpO2: 95 %  Pulse Oximetry Type: Continuous  Device (Oxygen Therapy): room air  Flow (L/min): 2  Flowsheet Rows      First Filed Value   Admission Height  180.3 cm (71\") Documented at 12/18/2020 1900   Admission Weight  67.1 kg (147 lb 14.9 oz) Documented at 12/18/2020 1900        Intake & Output (last 3 days)       12/16 0701 - 12/17 0700 12/17 0701 - 12/18 0700 12/18 0701 - 12/19 0700 12/19 0701 - 12/20 0700    P.O.   444 480    I.V. (mL/kg)   1000 (14.9)     Total Intake(mL/kg)   1444 (21.5) 480 (7.1)    Net   +1444 +480            Urine Unmeasured Occurrence   4 x 1 x    Stool Unmeasured Occurrence    1 x        Lines, Drains & Airways    Active LDAs     Name:   Placement date:   Placement time:   Site:   Days:    Peripheral IV (Ped/Tylre) 12/18/20 Right;Anterior Forearm   12/18/20    0330     1                Physical " Exam  Constitutional:       General: She is not in acute distress.     Appearance: Normal appearance. She is not ill-appearing.   HENT:      Head: Normocephalic and atraumatic.      Mouth/Throat:      Mouth: Mucous membranes are dry.   Eyes:      Extraocular Movements: Extraocular movements intact.      Pupils: Pupils are equal, round, and reactive to light.   Neck:      Musculoskeletal: Normal range of motion and neck supple. No neck rigidity or muscular tenderness.   Cardiovascular:      Rate and Rhythm: Normal rate and regular rhythm.      Pulses: Normal pulses.      Heart sounds: No murmur. No gallop.    Pulmonary:      Effort: Pulmonary effort is normal.      Breath sounds: Normal breath sounds.   Abdominal:      General: Abdomen is flat.   Musculoskeletal: Normal range of motion.   Skin:     General: Skin is warm.   Neurological:      Mental Status: She is alert. Mental status is at baseline.   Psychiatric:         Mood and Affect: Mood normal.         Behavior: Behavior normal.       Procedures:    Results Review:     I reviewed the patient's new clinical results.      Lab Results (last 24 hours)     Procedure Component Value Units Date/Time    Folate [[id number]]  (Normal) Collected: 12/19/20 0421    Specimen: Blood Updated: 12/19/20 1156     Folate 7.71 ng/mL     Narrative:      Results may be falsely increased if patient taking Biotin.      Vitamin B12 [[id number]]  (Normal) Collected: 12/19/20 0421    Specimen: Blood Updated: 12/19/20 1156     Vitamin B-12 476 pg/mL     Narrative:      Results may be falsely increased if patient taking Biotin.      Osmolality, Serum [[id number]]  (Abnormal) Collected: 12/19/20 0852    Specimen: Blood Updated: 12/19/20 0938     Osmolality 259 mOsm/kg     Magnesium [[id number]]  (Normal) Collected: 12/19/20 0421    Specimen: Blood Updated: 12/19/20 0539     Magnesium 1.9 mg/dL     Basic Metabolic Panel [[id number]]  (Abnormal) Collected: 12/19/20 0421    Specimen: Blood Updated:  12/19/20 0509     Glucose 101 mg/dL      BUN 7 mg/dL      Creatinine 0.47 mg/dL      Sodium 124 mmol/L      Potassium 3.6 mmol/L      Chloride 92 mmol/L      CO2 24.0 mmol/L      Calcium 8.0 mg/dL      eGFR Non African Amer 132 mL/min/1.73      BUN/Creatinine Ratio 14.9     Anion Gap 8.0 mmol/L     Narrative:      GFR Normal >60  Chronic Kidney Disease <60  Kidney Failure <15      CBC Auto Differential [[id number]]  (Abnormal) Collected: 12/19/20 0421    Specimen: Blood Updated: 12/19/20 0452     WBC 8.00 10*3/mm3      RBC 3.72 10*6/mm3      Hemoglobin 12.1 g/dL      Hematocrit 34.7 %      MCV 93.0 fL      MCH 32.4 pg      MCHC 34.9 g/dL      RDW 13.9 %      RDW-SD 45.1 fl      MPV 6.0 fL      Platelets 236 10*3/mm3      Neutrophil % 76.3 %      Lymphocyte % 13.2 %      Monocyte % 9.5 %      Eosinophil % 0.7 %      Basophil % 0.3 %      Neutrophils, Absolute 6.10 10*3/mm3      Lymphocytes, Absolute 1.10 10*3/mm3      Monocytes, Absolute 0.80 10*3/mm3      Eosinophils, Absolute 0.10 10*3/mm3      Basophils, Absolute 0.00 10*3/mm3      nRBC 0.0 /100 WBC     Urine Drug Screen - Urine, Clean Catch [[id number]]  (Normal) Collected: 12/19/20 0211    Specimen: Urine, Clean Catch Updated: 12/19/20 0242     Amphet/Methamphet, Screen Negative     Barbiturates Screen, Urine Negative     Benzodiazepine Screen, Urine Negative     Cocaine Screen, Urine Negative     Opiate Screen Negative     THC, Screen, Urine Negative     Methadone Screen, Urine Negative     Oxycodone Screen, Urine Negative    Narrative:      Negative Thresholds For Drugs Screened:     Amphetamines               500 ng/ml   Barbiturates               200 ng/ml   Benzodiazepines            100 ng/ml   Cocaine                    300 ng/ml   Methadone                  300 ng/ml   Opiates                    300 ng/ml   Oxycodone                  100 ng/ml   THC                        50 ng/ml    The Normal Value for all drugs tested is negative. This report includes  final unconfirmed screening results to be used for medical treatment purposes only. Unconfirmed results must not be used for non-medical purposes such as employment or legal testing. Clinical consideration should be applied to any drug of abuse test, particulary when unconfirmed results are used.  All urine drugs of abuse requests without chain of custody are for medical screening purposes only.  False positives are possible.      Urinalysis, Microscopic Only - Urine, Clean Catch [[id number]]  (Abnormal) Collected: 12/19/20 0211    Specimen: Urine, Clean Catch Updated: 12/19/20 0229     RBC, UA 0-2 /HPF      WBC, UA 6-12 /HPF      Bacteria, UA None Seen /HPF      Squamous Epithelial Cells, UA 0-2 /HPF      Hyaline Casts, UA 3-6 /LPF      Methodology Automated Microscopy    Urinalysis With Culture If Indicated - Urine, Clean Catch [[id number]]  (Abnormal) Collected: 12/19/20 0211    Specimen: Urine, Clean Catch Updated: 12/19/20 0229     Color, UA Yellow     Appearance, UA Clear     pH, UA 6.5     Specific Gravity, UA 1.017     Glucose, UA Negative     Ketones, UA Trace     Bilirubin, UA Negative     Blood, UA Negative     Protein, UA 30 mg/dL (1+)     Leuk Esterase, UA Small (1+)     Nitrite, UA Positive     Urobilinogen, UA 1.0 E.U./dL    Urine Culture - Urine, Urine, Clean Catch [[id number]] Collected: 12/19/20 0211    Specimen: Urine, Clean Catch Updated: 12/19/20 0229    Troponin [[id number]]  (Normal) Collected: 12/18/20 1524    Specimen: Blood Updated: 12/18/20 1614     Troponin T <0.010 ng/mL     Narrative:      Troponin T Reference Range:  <= 0.03 ng/mL-   Negative for AMI  >0.03 ng/mL-     Abnormal for myocardial necrosis.  Clinicians would have to utilize clinical acumen, EKG, Troponin and serial changes to determine if it is an Acute Myocardial Infarction or myocardial injury due to an underlying chronic condition.       Results may be falsely decreased if patient taking Biotin.      TSH [[id number]]  (Normal)  Collected: 12/18/20 1524    Specimen: Blood Updated: 12/18/20 1614     TSH 2.510 uIU/mL     Lipid Panel [[id number]] Collected: 12/18/20 1524    Specimen: Blood Updated: 12/18/20 1611     Total Cholesterol 125 mg/dL      Triglycerides 60 mg/dL      HDL Cholesterol 55 mg/dL      LDL Cholesterol  57 mg/dL      VLDL Cholesterol 13 mg/dL      LDL/HDL Ratio 1.05    Narrative:      Cholesterol Reference Ranges  (U.S[place] Department of Health and Human Services ATP III Classifications)    Desirable          <200 mg/dL  Borderline High    200-239 mg/dL  High Risk          >240 mg/dL      Triglyceride Reference Ranges  (U.S. Department of Health and Human Services ATP III Classifications)    Normal           <150 mg/dL  Borderline High  150-199 mg/dL  High             200-499 mg/dL  Very High        >500 mg/dL    HDL Reference Ranges  (U.S. Department of Health and Human Services ATP III Classifcations)    Low     <40 mg/dl (major risk factor for CHD)  High    >60 mg/dl ('negative' risk factor for CHD)        LDL Reference Ranges  (U.S. Department of Health and Human Services ATP III Classifcations)    Optimal          <100 mg/dL  Near Optimal     100-129 mg/dL  Borderline High  130-159 mg/dL  High             160-189 mg/dL  Very High        >189 mg/dL    Comprehensive Metabolic Panel [[id number]]  (Abnormal) Collected: 12/18/20 1524    Specimen: Blood Updated: 12/18/20 1611     Glucose 96 mg/dL      BUN 7 mg/dL      Creatinine 0.53 mg/dL      Sodium 121 mmol/L      Potassium 3.9 mmol/L      Comment: Slight hemolysis detected by analyzer. Results may be affected.        Chloride 91 mmol/L      CO2 23.0 mmol/L      Calcium 8.4 mg/dL      Total Protein 5.3 g/dL      Albumin 3.40 g/dL      ALT (SGPT) 15 U/L      AST (SGOT) 28 U/L      Alkaline Phosphatase 90 U/L      Total Bilirubin 0.9 mg/dL      eGFR Non African Amer 115 mL/min/1.73      Globulin 1.9 gm/dL      A/G Ratio 1.8 g/dL      BUN/Creatinine Ratio 13.2     Anion Gap 7.0  mmol/L     Narrative:      GFR Normal >60  Chronic Kidney Disease <60  Kidney Failure <15      Magnesium [[id number]]  (Abnormal) Collected: 12/18/20 1524    Specimen: Blood Updated: 12/18/20 1611     Magnesium 1.5 mg/dL     CBC Auto Differential [[id number]]  (Abnormal) Collected: 12/18/20 1524    Specimen: Blood Updated: 12/18/20 1551     WBC 8.70 10*3/mm3      RBC 3.90 10*6/mm3      Hemoglobin 12.7 g/dL      Hematocrit 36.9 %      MCV 94.5 fL      MCH 32.4 pg      MCHC 34.3 g/dL      RDW 13.9 %      RDW-SD 44.6 fl      MPV 6.3 fL      Platelets 276 10*3/mm3      Neutrophil % 81.9 %      Lymphocyte % 10.3 %      Monocyte % 7.3 %      Eosinophil % 0.3 %      Basophil % 0.2 %      Neutrophils, Absolute 7.20 10*3/mm3      Lymphocytes, Absolute 0.90 10*3/mm3      Monocytes, Absolute 0.60 10*3/mm3      Eosinophils, Absolute 0.00 10*3/mm3      Basophils, Absolute 0.00 10*3/mm3      nRBC 0.1 /100 WBC         No results found for: HGBA1C            No results found for: LIPASE  Lab Results   Component Value Date    CHOL 125 12/18/2020    TRIG 60 12/18/2020    HDL 55 12/18/2020    LDL 57 12/18/2020       No results found for: INTRAOP, PREDX, FINALDX, COMDX    Microbiology Results (last 10 days)     ** No results found for the last 240 hours. **          ECG/EMG Results (most recent)     Procedure Component Value Units Date/Time    ECG 12 Lead [[id number]] Collected: 12/18/20 1641     Updated: 12/18/20 1642     QT Interval 376 ms     Narrative:      HEART RATE= 88  bpm  RR Interval= 680  ms  MO Interval= 155  ms  P Horizontal Axis= -27  deg  P Front Axis= 41  deg  QRSD Interval= 105  ms  QT Interval= 376  ms  QRS Axis= -8  deg  T Wave Axis= 144  deg  - ABNORMAL ECG -  Sinus rhythm  Probable left atrial enlargement  LVH with secondary repolarization abnormality  Probable anterior infarct, age indeterminate  No previous ECG available for comparison  Electronically Signed By:   Date and Time of Study: 2020-12-18 16:41:08                           Xr Chest 1 View    Result Date: 12/18/2020  No acute chest finding.  Electronically Signed By-Libby Mcclure MD On:12/18/2020 3:50 PM This report was finalized on [id number] by  Libby Mcclure MD.          Xrays, labs reviewed personally by physician.    Medication Review:   I have reviewed the patient's current medication list      Scheduled Meds  amLODIPine, 10 mg, Oral, Q24H  atorvastatin, 40 mg, Oral, Nightly  citalopram, 20 mg, Oral, Daily  docusate sodium, 100 mg, Oral, BID  enoxaparin, 40 mg, Subcutaneous, Q24H  levothyroxine, 75 mcg, Oral, Q AM  lisinopril, 40 mg, Oral, Daily  magnesium oxide, 400 mg, Oral, Daily  risperiDONE, 1 mg, Oral, Daily  risperiDONE, 2 mg, Oral, Nightly  sodium chloride, 10 mL, Intravenous, Q12H        Meds Infusions  sodium chloride, 50 mL/hr, Last Rate: 50 mL/hr (12/19/20 1050)        Meds PRN  •  acetaminophen  •  ondansetron  •  sodium chloride        Assessment/Plan   Assessment/Plan     Active Hospital Problems    Diagnosis  POA   • Hyponatremia [E87.1]  Yes      Resolved Hospital Problems   No resolved problems to display.       MEDICAL DECISION MAKING COMPLEXITY BY PROBLEM:     AMS/acute toxic metabolic encephalopathy     -multifactorial, 2/2 hyponatremia, volume contraction, suspected UTI     Hyponatremia (maybe chronic )       -serum osmolality of 259, gentle  hydration with normal saline and monitor sodium closely        -if no improvement, or worsen will consider nephrology consultation and fluid restrict     Longstanding history of alcohol abuse     Accelerated hypertension      -increase lisinopril and added Norvasc     Bipolar disorder      -celexa/risperdal     Hypothyroidism      -Levothyroxine, tsh 2.5     Mixed hyperlipidemia       -Lipitor     Mechanical fall at home (with multiple bruises present on admission)/weakness       -tsh, b12,and folate within normal limits        -appreciate PT evaluation and recommendation            VTE Prophylaxis  -   Mechanical Order History:     None      Pharmalogical Order History:      Ordered     Dose Route Frequency Stop    12/18/20 1514  enoxaparin (LOVENOX) syringe 40 mg      40 mg SC Every 24 Hours Scheduled --                  Code Status -   Code Status and Medical Interventions:   Ordered at: 12/18/20 1514     Level Of Support Discussed With:    Patient     Code Status:    CPR     Medical Interventions (Level of Support Prior to Arrest):    Full         Discharge Planning    Electronically signed by Lenard Renner MD, 12/19/20, 14:11 EST.  [facility] Hospitalist Team        Electronically signed by Lenard Renner MD at 12/20/20 0848       Consult Notes (last 72 hours) (Notes from 12/18/20 1449 through 12/21/20 1449)    No notes of this type exist for this encounter.

## 2020-12-22 NOTE — PROGRESS NOTES
Case Management Discharge Note      Final Note: Luis Ball    Provided Post Acute Provider List?: Yes  Post Acute Provider List: Inpatient Rehab, Nursing Home  Delivered To: Patient  Method of Delivery: In person    Selected Continued Care - Discharged on 12/21/2020 Admission date: 12/18/2020 - Discharge disposition: Rehab Facility or Unit (DC - External)                 Final Discharge Disposition Code: 03 - skilled nursing facility (SNF)